# Patient Record
Sex: FEMALE | Race: WHITE | NOT HISPANIC OR LATINO | Employment: FULL TIME | URBAN - METROPOLITAN AREA
[De-identification: names, ages, dates, MRNs, and addresses within clinical notes are randomized per-mention and may not be internally consistent; named-entity substitution may affect disease eponyms.]

---

## 2023-06-19 LAB — CHORIOGONADOTROPIN (MIU/ML) IN SER/PLAS: ABNORMAL MIU/ML

## 2023-06-22 LAB — CHORIOGONADOTROPIN (MIU/ML) IN SER/PLAS: ABNORMAL MIU/ML

## 2023-07-06 LAB
ERYTHROCYTE DISTRIBUTION WIDTH (RATIO) BY AUTOMATED COUNT: 13.1 % (ref 11.5–14.5)
ERYTHROCYTE MEAN CORPUSCULAR HEMOGLOBIN CONCENTRATION (G/DL) BY AUTOMATED: 33.2 G/DL (ref 32–36)
ERYTHROCYTE MEAN CORPUSCULAR VOLUME (FL) BY AUTOMATED COUNT: 88 FL (ref 80–100)
ERYTHROCYTES (10*6/UL) IN BLOOD BY AUTOMATED COUNT: 4.37 X10E12/L (ref 4–5.2)
HEMATOCRIT (%) IN BLOOD BY AUTOMATED COUNT: 38.5 % (ref 36–46)
HEMOGLOBIN (G/DL) IN BLOOD: 12.8 G/DL (ref 12–16)
HEPATITIS B VIRUS SURFACE AG PRESENCE IN SERUM: NONREACTIVE
HEPATITIS C VIRUS AB PRESENCE IN SERUM: NONREACTIVE
HIV 1/ 2 AG/AB SCREEN: NONREACTIVE
LEAD (UG/DL) IN BLOOD: <0.5 UG/DL (ref 0–4.9)
LEUKOCYTES (10*3/UL) IN BLOOD BY AUTOMATED COUNT: 7.7 X10E9/L (ref 4.4–11.3)
PLATELETS (10*3/UL) IN BLOOD AUTOMATED COUNT: 259 X10E9/L (ref 150–450)
REFLEX ADDED, ANEMIA PANEL: NORMAL
RUBELLA VIRUS IGG AB: POSITIVE
SYPHILIS TOTAL AB: NONREACTIVE

## 2023-07-07 LAB
ABO GROUP (TYPE) IN BLOOD: NORMAL
ANTIBODY SCREEN: NORMAL
RH FACTOR: NORMAL

## 2023-07-08 LAB
CHLAMYDIA TRACH., AMPLIFIED: NORMAL
N. GONORRHEA, AMPLIFIED: NORMAL
TRICHOMONAS VAGINALIS: NORMAL

## 2023-07-10 LAB — URINE CULTURE: NORMAL

## 2023-08-12 LAB
CHLAMYDIA TRACH., AMPLIFIED: NEGATIVE
N. GONORRHEA, AMPLIFIED: NEGATIVE

## 2023-08-13 LAB — URINE CULTURE: NO GROWTH

## 2023-10-05 PROBLEM — Z87.59 HISTORY OF GESTATIONAL HYPERTENSION: Status: ACTIVE | Noted: 2023-10-05

## 2023-10-05 PROBLEM — N92.6 MISSED MENSES: Status: ACTIVE | Noted: 2023-10-05

## 2023-10-05 PROBLEM — O20.9 BLEEDING IN EARLY PREGNANCY (HHS-HCC): Status: ACTIVE | Noted: 2023-10-05

## 2023-10-05 PROBLEM — Z77.011 LEAD EXPOSURE: Status: ACTIVE | Noted: 2023-10-05

## 2023-10-05 RX ORDER — ASPIRIN 81 MG/1
2 TABLET ORAL DAILY
COMMUNITY
End: 2024-01-03 | Stop reason: HOSPADM

## 2023-10-05 RX ORDER — SYRINGE WITH NEEDLE, INSULIN
SYRINGE, EMPTY DISPOSABLE MISCELLANEOUS
COMMUNITY
End: 2024-01-03 | Stop reason: HOSPADM

## 2023-10-05 RX ORDER — DEXTROAMPHETAMINE SACCHARATE, AMPHETAMINE ASPARTATE, DEXTROAMPHETAMINE SULFATE AND AMPHETAMINE SULFATE 7.5; 7.5; 7.5; 7.5 MG/1; MG/1; MG/1; MG/1
TABLET ORAL
COMMUNITY

## 2023-10-06 ENCOUNTER — ROUTINE PRENATAL (OUTPATIENT)
Dept: OBSTETRICS AND GYNECOLOGY | Facility: CLINIC | Age: 33
End: 2023-10-06
Payer: COMMERCIAL

## 2023-10-06 VITALS — BODY MASS INDEX: 22.55 KG/M2 | DIASTOLIC BLOOD PRESSURE: 78 MMHG | SYSTOLIC BLOOD PRESSURE: 110 MMHG | WEIGHT: 144 LBS

## 2023-10-06 DIAGNOSIS — Z3A.23 23 WEEKS GESTATION OF PREGNANCY (HHS-HCC): Primary | ICD-10-CM

## 2023-10-06 DIAGNOSIS — Z34.82 PRENATAL CARE, SUBSEQUENT PREGNANCY IN SECOND TRIMESTER (HHS-HCC): ICD-10-CM

## 2023-10-06 PROCEDURE — 0501F PRENATAL FLOW SHEET: CPT

## 2023-10-06 NOTE — PROGRESS NOTES
Assessment/Plan   33 y.o.  at 23w4d  - Pt forgot birth preferences packet at home; will fill out and bring next time  - Provided with GCT supplies, instructions provided  - Routine prenatal care    Follow up in 4 weeks for next prenatal visit with JOSE E Rivas for TOLAC consult. GCT/CBC at that visit. Will review birth preferences at next visit with myself.    GAYE Szymanski-GLO    Subjective     Luba Middleton is a 33 y.o.  at 24w0d with a working estimated date of delivery of 2024, by Ultrasound presenting for a routine prenatal visit. She is doing well, no concerns. She denies vaginal bleeding, leakage of fluid, decreased fetal movements, or contractions.  Looking forward to anniversary dinner and pumpkin patch this weekend.     Her pregnancy is complicated by:  Pregnancy Problems (from 23 to present)       Problem Noted Resolved    Bleeding in early pregnancy 10/5/2023 by Tarah Arnett No    Priority:  Medium               Objective   Weight: 65.3 kg (144 lb)  Expected Total Weight Gain: 12.5 kg (27 lb)-18 kg (39 lb)   Pregravid BMI: 18.48  BP: 110/78  Fetal Heart Rate: 145 Fundal Height (cm): 23 cm

## 2023-11-01 ENCOUNTER — ROUTINE PRENATAL (OUTPATIENT)
Dept: OBSTETRICS AND GYNECOLOGY | Facility: CLINIC | Age: 33
End: 2023-11-01
Payer: COMMERCIAL

## 2023-11-01 VITALS — SYSTOLIC BLOOD PRESSURE: 120 MMHG | WEIGHT: 151.5 LBS | BODY MASS INDEX: 23.73 KG/M2 | DIASTOLIC BLOOD PRESSURE: 80 MMHG

## 2023-11-01 DIAGNOSIS — Z34.80 SUPERVISION OF OTHER NORMAL PREGNANCY, ANTEPARTUM (HHS-HCC): Primary | ICD-10-CM

## 2023-11-01 DIAGNOSIS — Z13.1 DIABETES MELLITUS SCREENING: ICD-10-CM

## 2023-11-01 DIAGNOSIS — O34.219 HISTORY OF CESAREAN SECTION COMPLICATING PREGNANCY (HHS-HCC): ICD-10-CM

## 2023-11-01 DIAGNOSIS — O20.9 BLEEDING IN EARLY PREGNANCY (HHS-HCC): ICD-10-CM

## 2023-11-01 DIAGNOSIS — O09.529 ANTEPARTUM MULTIGRAVIDA OF ADVANCED MATERNAL AGE (HHS-HCC): ICD-10-CM

## 2023-11-01 DIAGNOSIS — Z23 ENCOUNTER FOR VACCINATION: ICD-10-CM

## 2023-11-01 DIAGNOSIS — Z3A.27 27 WEEKS GESTATION OF PREGNANCY (HHS-HCC): ICD-10-CM

## 2023-11-01 LAB
ERYTHROCYTE [DISTWIDTH] IN BLOOD BY AUTOMATED COUNT: 12.6 % (ref 11.5–14.5)
GLUCOSE 1H P 50 G GLC PO SERPL-MCNC: 74 MG/DL
HCT VFR BLD AUTO: 33.8 % (ref 36–46)
HGB BLD-MCNC: 11.2 G/DL (ref 12–16)
MCH RBC QN AUTO: 30.1 PG (ref 26–34)
MCHC RBC AUTO-ENTMCNC: 33.1 G/DL (ref 32–36)
MCV RBC AUTO: 91 FL (ref 80–100)
NRBC BLD-RTO: 0 /100 WBCS (ref 0–0)
PLATELET # BLD AUTO: 264 X10*3/UL (ref 150–450)
RBC # BLD AUTO: 3.72 X10*6/UL (ref 4–5.2)
WBC # BLD AUTO: 9.8 X10*3/UL (ref 4.4–11.3)

## 2023-11-01 PROCEDURE — 90472 IMMUNIZATION ADMIN EACH ADD: CPT | Performed by: OBSTETRICS & GYNECOLOGY

## 2023-11-01 PROCEDURE — 85027 COMPLETE CBC AUTOMATED: CPT

## 2023-11-01 PROCEDURE — 90715 TDAP VACCINE 7 YRS/> IM: CPT | Performed by: OBSTETRICS & GYNECOLOGY

## 2023-11-01 PROCEDURE — 82947 ASSAY GLUCOSE BLOOD QUANT: CPT

## 2023-11-01 PROCEDURE — 90471 IMMUNIZATION ADMIN: CPT | Performed by: OBSTETRICS & GYNECOLOGY

## 2023-11-01 PROCEDURE — 90686 IIV4 VACC NO PRSV 0.5 ML IM: CPT | Performed by: OBSTETRICS & GYNECOLOGY

## 2023-11-01 PROCEDURE — 0501F PRENATAL FLOW SHEET: CPT | Performed by: OBSTETRICS & GYNECOLOGY

## 2023-11-01 PROCEDURE — 36415 COLL VENOUS BLD VENIPUNCTURE: CPT

## 2023-11-02 LAB — REFLEX ADDED, ANEMIA PANEL: NORMAL

## 2023-11-08 PROBLEM — O09.529 ANTEPARTUM MULTIGRAVIDA OF ADVANCED MATERNAL AGE (HHS-HCC): Status: ACTIVE | Noted: 2023-11-08

## 2023-11-08 PROBLEM — Z34.80 SUPERVISION OF OTHER NORMAL PREGNANCY, ANTEPARTUM (HHS-HCC): Status: ACTIVE | Noted: 2023-11-08

## 2023-11-08 PROBLEM — N92.6 MISSED MENSES: Status: RESOLVED | Noted: 2023-10-05 | Resolved: 2023-11-08

## 2023-11-08 PROBLEM — O34.219 HISTORY OF CESAREAN SECTION COMPLICATING PREGNANCY (HHS-HCC): Status: ACTIVE | Noted: 2023-11-08

## 2023-11-08 PROBLEM — O99.513 ASTHMA AFFECTING PREGNANCY IN THIRD TRIMESTER (HHS-HCC): Status: ACTIVE | Noted: 2023-11-08

## 2023-11-08 PROBLEM — F90.2 ATTENTION DEFICIT HYPERACTIVITY DISORDER (ADHD), COMBINED TYPE: Status: ACTIVE | Noted: 2023-11-08

## 2023-11-08 PROBLEM — O09.522 MULTIGRAVIDA OF ADVANCED MATERNAL AGE IN SECOND TRIMESTER (HHS-HCC): Status: ACTIVE | Noted: 2023-11-08

## 2023-11-08 PROBLEM — Z77.011 LEAD EXPOSURE: Status: RESOLVED | Noted: 2023-10-05 | Resolved: 2023-11-08

## 2023-11-08 PROBLEM — J45.909 ASTHMA AFFECTING PREGNANCY IN THIRD TRIMESTER (HHS-HCC): Status: ACTIVE | Noted: 2023-11-08

## 2023-11-08 NOTE — PROGRESS NOTES
Routine prenatal visit   Pt doing well overall - no complaints today.  Physician visit today to discuss TOLAC - risks/benefits discussed in detail and TOLAC consent obtained. Prior c/s was for breech.  Glucola today.  Rh positive. Flu and Tdap vaccines given today.  Discussed recommendation for updated COVID vaccine.     Medical Problems       Problem List       History of gestational hypertension    Overview Signed 2023  1:10 PM by Fadumo Rivas MD     - on prophylactic ASA          Supervision of other normal pregnancy, antepartum    Overview Signed 2023  1:10 PM by Fadumo Rivas MD     - [facility] - reports negative carrier screening in first pregnancy          History of  section complicating pregnancy    Overview Signed 2023  1:12 PM by Fadumo Rivas MD     - done for breech   - planning TOLAC  - TOLAC consent signed    - [facility] 80%         Attention deficit hyperactivity disorder (ADHD), combined type    Overview Signed 2023  1:12 PM by Fadumo Rivas MD     - on adderall          Asthma affecting pregnancy in third trimester    Antepartum multigravida of advanced maternal age    Overview Signed 2023  1:13 PM by Fadumo Rivas MD     - risk-reducing NIPS                Follow up in 3 week(s).

## 2023-11-15 ENCOUNTER — TELEPHONE (OUTPATIENT)
Dept: OBSTETRICS AND GYNECOLOGY | Facility: CLINIC | Age: 33
End: 2023-11-15

## 2023-11-15 NOTE — TELEPHONE ENCOUNTER
Patient sent the following email:    Camron Frye,     I was going to call the office but I thought emailing you might be easier/quicker. Please let me know if I should just call.     Dotty Dawson, my midwife, gave me a Birth Plan document at my last visit which I was supposed to fill out to go over with her at my next appointment which is this Friday. I lost the document! Is there any way you can send me the Birth Plan document?     Let me know and thanks so much for your time and attention to this matter.     Rylan,   Luba      Patient emailed form.

## 2023-11-17 ENCOUNTER — ROUTINE PRENATAL (OUTPATIENT)
Dept: OBSTETRICS AND GYNECOLOGY | Facility: CLINIC | Age: 33
End: 2023-11-17
Payer: COMMERCIAL

## 2023-11-17 VITALS — WEIGHT: 153 LBS | DIASTOLIC BLOOD PRESSURE: 74 MMHG | SYSTOLIC BLOOD PRESSURE: 118 MMHG | BODY MASS INDEX: 23.96 KG/M2

## 2023-11-17 DIAGNOSIS — Z34.83 PRENATAL CARE, SUBSEQUENT PREGNANCY IN THIRD TRIMESTER (HHS-HCC): ICD-10-CM

## 2023-11-17 DIAGNOSIS — Z3A.29 29 WEEKS GESTATION OF PREGNANCY (HHS-HCC): Primary | ICD-10-CM

## 2023-11-17 PROCEDURE — 0501F PRENATAL FLOW SHEET: CPT

## 2023-11-17 NOTE — PROGRESS NOTES
Assessment/Plan   33 y.o.  at 29w4d  - birth preferences reviewed and scanned into chart; pt prefers not to be induced, NCB, Nitrous if/as needed. Aware she will need continuous FM and saline lock due to history of  section.   - Routine prenatal care    Follow up in 2 weeks for next prenatal visit    COLTON Szymanski    Subjective     Luba Middleton is a 33 y.o.  at 29w4d with a working estimated date of delivery of 2024, by Last Menstrual Period presenting for a routine prenatal visit. She is doing well, no concerns. She denies vaginal bleeding, leakage of fluid, or regular contractions. She endorses good FM.    Pregnancy Problems (from 23 to present)       Problem Noted Resolved    Supervision of other normal pregnancy, antepartum 2023 by Fadumo Rivas MD No    Priority:  Medium      Overview Addendum 2023  2:13 PM by COLTON Szymanski     - [employer] - reports negative carrier screening in first pregnancy   - s/p flu vaccine  - s/p Tdap vaccine  - Updated COVID vaccine recommended  - recommend RSV vaccine between 32-36w  - Birth plan: TOLAC, prefers not to be induced, NCB, Nitrous if needed         History of  section complicating pregnancy 2023 by Fadumo Rivas MD No    Priority:  Medium      Overview Signed 2023  1:12 PM by Fadumo Rivas MD     - done for breech   - planning TOLAC  - TOLAC consent signed    - [facility] 80%         Asthma affecting pregnancy in third trimester 2023 by Fadumo Rivas MD No    Priority:  Medium      Antepartum multigravida of advanced maternal age 2023 by Fadumo Rivas MD No    Priority:  Medium      Overview Signed 2023  1:13 PM by Fadumo Rivas MD     - risk-reducing NIPS          History of gestational hypertension 10/5/2023 by Tarah Arnett No    Priority:  Medium      Overview Signed 2023  1:10 PM by Fadumo Rivas MD     -  on prophylactic ASA                  Objective   Weight: 69.4 kg (153 lb)  TWG: 15.9 kg (35 lb)   Expected Total Weight Gain: 12.5 kg (27 lb)-18 kg (39 lb)   Pregravid BMI: 18.48  Pregravid Weight: 53.5 kg (118 lb)   BP: 118/74  Fetal Heart Rate: 148 Fundal Height (cm): 30 cm

## 2023-12-01 ENCOUNTER — ROUTINE PRENATAL (OUTPATIENT)
Dept: OBSTETRICS AND GYNECOLOGY | Facility: CLINIC | Age: 33
End: 2023-12-01
Payer: COMMERCIAL

## 2023-12-01 VITALS — DIASTOLIC BLOOD PRESSURE: 88 MMHG | BODY MASS INDEX: 24.21 KG/M2 | SYSTOLIC BLOOD PRESSURE: 136 MMHG | WEIGHT: 154.6 LBS

## 2023-12-01 DIAGNOSIS — Z34.83 PRENATAL CARE, SUBSEQUENT PREGNANCY IN THIRD TRIMESTER (HHS-HCC): Primary | ICD-10-CM

## 2023-12-01 DIAGNOSIS — F90.2 ATTENTION DEFICIT HYPERACTIVITY DISORDER (ADHD), COMBINED TYPE: ICD-10-CM

## 2023-12-01 DIAGNOSIS — Z3A.31 31 WEEKS GESTATION OF PREGNANCY (HHS-HCC): ICD-10-CM

## 2023-12-01 PROCEDURE — 0501F PRENATAL FLOW SHEET: CPT

## 2023-12-01 NOTE — PROGRESS NOTES
Assessment/Plan   33 y.o.  at 31w4d  - Recommended RSV vaccination between 32-36 weeks gestation, may receive at [facility]. Pt plans to get this done.  - Growth scan ordered d/t ADHD on adderall; pt instructed on how to schedule  - If breech on scan, recommended chiropractic care to encourage fetus to turn vertex  - Routine prenatal care    Follow up in 2 weeks for next prenatal visit. Did she get RSV vaccine? Review results from growth scan.    COLTON Szymanski    Subjective     Luba Middleton is a 33 y.o.  at 31w4d with a working estimated date of delivery of 2024, by Last Menstrual Period presenting for a routine prenatal visit. She is doing well, no concerns. She denies vaginal bleeding, leakage of fluid, or regular contractions. She endorses good FM.    She is fearful of the baby being breech again, as her prior experience with an unsuccessful version was traumatic.     Her pregnancy is complicated by:  Pregnancy Problems (from 23 to present)       Problem Noted Resolved    Supervision of other normal pregnancy, antepartum 2023 by Fadumo Rivas MD No    Priority:  Medium      Overview Addendum 2023  2:13 PM by COLTON Szymanski     - [employer] - reports negative carrier screening in first pregnancy   - s/p flu vaccine  - s/p Tdap vaccine  - Updated COVID vaccine recommended  - recommend RSV vaccine between 32-36w  - Birth plan: TOLAC, prefers not to be induced, NCB, Nitrous if needed         History of  section complicating pregnancy 2023 by Fadumo Rivas MD No    Priority:  Medium      Overview Signed 2023  1:12 PM by Fadumo Rivas MD     - done for breech   - planning TOLAC  - TOLAC consent signed    - [facility] 80%         Asthma affecting pregnancy in third trimester 2023 by Fadumo Rivas MD No    Priority:  Medium      Antepartum multigravida of advanced maternal age 2023 by Fadumo PLASCENCIA  MD Rob No    Priority:  Medium      Overview Signed 2023  1:13 PM by Fadumo Rivas MD     - risk-reducing NIPS          History of gestational hypertension 10/5/2023 by Tarah Arnett No    Priority:  Medium      Overview Signed 2023  1:10 PM by Fadumo Rivas MD     - on prophylactic ASA                   Objective   Weight: 70.1 kg (154 lb 9.6 oz)  TW.6 kg (36 lb 9.6 oz)   Expected Total Weight Gain: 12.5 kg (27 lb)-18 kg (39 lb)   Pregravid BMI: 18.48  Pregravid Weight: 53.5 kg (118 lb)   BP: 136/88  Fetal Heart Rate: 150 Fundal Height (cm): 31 cm Presentation: Vertex

## 2023-12-04 ENCOUNTER — TELEPHONE (OUTPATIENT)
Dept: OBSTETRICS AND GYNECOLOGY | Facility: CLINIC | Age: 33
End: 2023-12-04

## 2023-12-04 PROBLEM — U07.1 SARS-COV-2 POSITIVE: Status: ACTIVE | Noted: 2023-12-04

## 2023-12-13 ENCOUNTER — ANCILLARY PROCEDURE (OUTPATIENT)
Dept: RADIOLOGY | Facility: CLINIC | Age: 33
End: 2023-12-13
Payer: COMMERCIAL

## 2023-12-13 DIAGNOSIS — Z34.83 PRENATAL CARE, SUBSEQUENT PREGNANCY IN THIRD TRIMESTER (HHS-HCC): ICD-10-CM

## 2023-12-13 DIAGNOSIS — F90.2 ATTENTION DEFICIT HYPERACTIVITY DISORDER (ADHD), COMBINED TYPE: ICD-10-CM

## 2023-12-13 PROCEDURE — 76819 FETAL BIOPHYS PROFIL W/O NST: CPT

## 2023-12-13 PROCEDURE — 76819 FETAL BIOPHYS PROFIL W/O NST: CPT | Performed by: OBSTETRICS & GYNECOLOGY

## 2023-12-13 PROCEDURE — 76816 OB US FOLLOW-UP PER FETUS: CPT

## 2023-12-13 PROCEDURE — 76816 OB US FOLLOW-UP PER FETUS: CPT | Performed by: OBSTETRICS & GYNECOLOGY

## 2023-12-15 ENCOUNTER — ROUTINE PRENATAL (OUTPATIENT)
Dept: OBSTETRICS AND GYNECOLOGY | Facility: CLINIC | Age: 33
End: 2023-12-15
Payer: COMMERCIAL

## 2023-12-15 VITALS — WEIGHT: 155 LBS | SYSTOLIC BLOOD PRESSURE: 118 MMHG | BODY MASS INDEX: 24.28 KG/M2 | DIASTOLIC BLOOD PRESSURE: 90 MMHG

## 2023-12-15 DIAGNOSIS — O13.9 GESTATIONAL HYPERTENSION AFFECTING SECOND PREGNANCY (HHS-HCC): Primary | ICD-10-CM

## 2023-12-15 LAB
ALBUMIN SERPL BCP-MCNC: 3.2 G/DL (ref 3.4–5)
ALP SERPL-CCNC: 97 U/L (ref 33–110)
ALT SERPL W P-5'-P-CCNC: 10 U/L (ref 7–45)
ANION GAP SERPL CALC-SCNC: 11 MMOL/L (ref 10–20)
AST SERPL W P-5'-P-CCNC: 15 U/L (ref 9–39)
BILIRUB SERPL-MCNC: 1.1 MG/DL (ref 0–1.2)
BUN SERPL-MCNC: 9 MG/DL (ref 6–23)
CALCIUM SERPL-MCNC: 8.5 MG/DL (ref 8.6–10.3)
CHLORIDE SERPL-SCNC: 104 MMOL/L (ref 98–107)
CO2 SERPL-SCNC: 25 MMOL/L (ref 21–32)
CREAT SERPL-MCNC: 0.72 MG/DL (ref 0.5–1.05)
CREAT UR-MCNC: 109.3 MG/DL (ref 20–320)
ERYTHROCYTE [DISTWIDTH] IN BLOOD BY AUTOMATED COUNT: 12.4 % (ref 11.5–14.5)
GFR SERPL CREATININE-BSD FRML MDRD: >90 ML/MIN/1.73M*2
GLUCOSE SERPL-MCNC: 64 MG/DL (ref 74–99)
HCT VFR BLD AUTO: 35.5 % (ref 36–46)
HGB BLD-MCNC: 11.5 G/DL (ref 12–16)
MCH RBC QN AUTO: 28.5 PG (ref 26–34)
MCHC RBC AUTO-ENTMCNC: 32.4 G/DL (ref 32–36)
MCV RBC AUTO: 88 FL (ref 80–100)
NRBC BLD-RTO: 0 /100 WBCS (ref 0–0)
PLATELET # BLD AUTO: 281 X10*3/UL (ref 150–450)
POTASSIUM SERPL-SCNC: 4.2 MMOL/L (ref 3.5–5.3)
PROT SERPL-MCNC: 6.1 G/DL (ref 6.4–8.2)
PROT UR-ACNC: 10 MG/DL (ref 5–24)
PROT/CREAT UR: 0.09 MG/MG CREAT (ref 0–0.17)
RBC # BLD AUTO: 4.04 X10*6/UL (ref 4–5.2)
SODIUM SERPL-SCNC: 136 MMOL/L (ref 136–145)
WBC # BLD AUTO: 9 X10*3/UL (ref 4.4–11.3)

## 2023-12-15 PROCEDURE — 0501F PRENATAL FLOW SHEET: CPT | Performed by: ADVANCED PRACTICE MIDWIFE

## 2023-12-15 PROCEDURE — 84156 ASSAY OF PROTEIN URINE: CPT

## 2023-12-15 PROCEDURE — 85027 COMPLETE CBC AUTOMATED: CPT

## 2023-12-15 PROCEDURE — 80053 COMPREHEN METABOLIC PANEL: CPT

## 2023-12-15 PROCEDURE — 82570 ASSAY OF URINE CREATININE: CPT

## 2023-12-15 PROCEDURE — 36415 COLL VENOUS BLD VENIPUNCTURE: CPT

## 2023-12-15 NOTE — PROGRESS NOTES
Subjective   Patient ID [id number]   Luba Middleton is a 33 y.o.  at 33w4d with a working estimated date of delivery of 2024, by Last Menstrual Period who presents for a routine prenatal visit. She denies vaginal bleeding, contractions or leaking of fluid.  B/p 118/90 repeat 135/95  No complaints today    Objective   Physical Exam:     Constitutional: Alert and oriented, cooperative, no acute distress, well nourished, well hydrated     HEENT: normal     OB:  fundus at 33 cm    Neuropsych: normal affect, well groomed, good eye contact           , Pregravid BMI: 18.48  Expected Total Weight Gain: 12.5 kg (27 lb)-18 kg (39 lb)      Medical Problems       Problem List       History of gestational hypertension    Overview Addendum 12/15/2023  2:02 PM by COLTON Sen     - on prophylactic ASA   - 12/15 b/p elevation at 33 weeks, NST PET labs  - close interval visit  for b/p check         Supervision of other normal pregnancy, antepartum    Overview Addendum 2023  2:13 PM by COLTON Szymanski     - [employer] - reports negative carrier screening in first pregnancy   - s/p flu vaccine  - s/p Tdap vaccine  - Updated COVID vaccine recommended  - recommend RSV vaccine between 32-36w  - Birth plan: TOLAC, prefers not to be induced, NCB, Nitrous if needed         History of  section complicating pregnancy    Overview Signed 2023  1:12 PM by Fadumo Rivas MD     - done for breech   - planning TOLAC  - TOLAC consent signed    - [facility] 80%         Attention deficit hyperactivity disorder (ADHD), combined type    Overview Addendum 2023  8:16 AM by COLTON Szymanski     - on adderall   - growth scan WNL @ 33w         Asthma affecting pregnancy in third trimester    Antepartum multigravida of advanced maternal age    Overview Signed 2023  1:13 PM by Fadumo Rivas MD     - risk-reducing NIPS          SARS-CoV-2 positive    Overview Signed  2023  3:02 PM by COLTON Szymanski     Positive home test on 12/3/23                       Assessment/Plan   Elevated b/p today. NST PET labs Janet to follow up on labs  Continue prenatal vitamin.   labor signs and symptoms reviewed, discuss [facility] level 1 if any admission necessary prior to 35 weeks will need to go to [facility] main [facility]  Pt will likely chose a repeat  if IOL is needed for hypertension  Inquire about birthing plans (epidural, NCB, HBC, Room 8, nitrous)  Discussed diagnosis of gestational hypertension if b/p is elevated on Monday and subsequent plan of care  Follow up in 3 days for b/p check

## 2023-12-18 ENCOUNTER — ROUTINE PRENATAL (OUTPATIENT)
Dept: OBSTETRICS AND GYNECOLOGY | Facility: CLINIC | Age: 33
End: 2023-12-18
Payer: COMMERCIAL

## 2023-12-18 VITALS — DIASTOLIC BLOOD PRESSURE: 78 MMHG | BODY MASS INDEX: 24.75 KG/M2 | SYSTOLIC BLOOD PRESSURE: 118 MMHG | WEIGHT: 158 LBS

## 2023-12-18 DIAGNOSIS — Z3A.34 34 WEEKS GESTATION OF PREGNANCY (HHS-HCC): Primary | ICD-10-CM

## 2023-12-18 DIAGNOSIS — Z34.83 PRENATAL CARE, SUBSEQUENT PREGNANCY IN THIRD TRIMESTER (HHS-HCC): ICD-10-CM

## 2023-12-18 PROCEDURE — 59426 ANTEPARTUM CARE ONLY: CPT

## 2023-12-18 NOTE — PROGRESS NOTES
Assessment/Plan   33 y.o.  at 34w0d  - BP today 118/78  - reviewed s/s of PEC for which to call emergency answering service line  - discussed GBS to be collected at next visit, all questions answered. Pt denies allergy to PCN.   - Routine prenatal care    Follow up in 2 weeks for next prenatal visit. GBS and labor consent at next visit.    COLTON Szymanski    Subjective     Luba Middleton is a 33 y.o.  at 34w0d with a working estimated date of delivery of 2024, by Last Menstrual Period presenting for a close-interval prenatal visit to monitor BP. Pt was seen last on 12/15/23 by FREDRICK Auguste and noted to have a mild-range BP of 135/95, repeat 118/90. She had a reactive NST and nml PEC labs drawn at that time.   She is doing well, no concerns. She denies vaginal bleeding, leakage of fluid, or regular contractions. She endorses good FM.    Pregnancy Problems (from 23 to present)       Problem Noted Resolved    SARS-CoV-2 positive 2023 by COLTON Szymanski No    Priority:  Medium      Overview Signed 2023  3:02 PM by COLTON Szymanski     Positive home test on 12/3/23         Supervision of other normal pregnancy, antepartum 2023 by Fadumo Rivas MD No    Priority:  Medium      Overview Addendum 2023  9:17 AM by COLTON Szymanski     - [employer] - reports negative carrier screening in first pregnancy   - s/p flu vaccine  - s/p Tdap vaccine  - Updated COVID vaccine recommended  - recommend RSV vaccine between 32-36w  - Birth plan: TOLAC, prefers not to be induced, NCB, Nitrous if needed. Pt will likely choose a repeat  if IOL is needed for hypertension          History of  section complicating pregnancy 2023 by Fadumo Rivas MD No    Priority:  Medium      Overview Signed 2023  1:12 PM by Fadumo Rivas MD     - done for breech   - planning TOLAC  - TOLAC consent signed    - [facility] 80%          Asthma affecting pregnancy in third trimester 2023 by Fadumo Rivas MD No    Priority:  Medium      Antepartum multigravida of advanced maternal age 2023 by Fadumo Rivas MD No    Priority:  Medium      Overview Signed 2023  1:13 PM by Fadumo Rivas MD     - risk-reducing NIPS          History of gestational hypertension 10/5/2023 by Tarah Arnett No    Priority:  Medium      Overview Addendum 2023  9:18 AM by GAYE Szymanski-GUSTAVOM     - on prophylactic ASA   - 12/15 BP elevation at 33 weeks, NST, PET labs WNL  - close interval visit  for b/p check                 Objective   Weight: 71.7 kg (158 lb)  TW.1 kg (40 lb)   Expected Total Weight Gain: 12.5 kg (27 lb)-18 kg (39 lb)   Pregravid BMI: 18.48  Pregravid Weight: 53.5 kg (118 lb)   BP: 118/78  Fetal Heart Rate: 135 Fundal Height (cm): 34 cm Presentation: Vertex

## 2024-01-01 ENCOUNTER — HOSPITAL ENCOUNTER (INPATIENT)
Facility: HOSPITAL | Age: 34
LOS: 2 days | Discharge: HOME | End: 2024-01-03
Attending: OBSTETRICS & GYNECOLOGY
Payer: COMMERCIAL

## 2024-01-01 DIAGNOSIS — K59.03 DRUG-INDUCED CONSTIPATION: ICD-10-CM

## 2024-01-01 PROBLEM — O42.919 PRETERM PREMATURE RUPTURE OF MEMBRANES (PPROM) WITH UNKNOWN ONSET OF LABOR (HHS-HCC): Status: ACTIVE | Noted: 2024-01-01

## 2024-01-01 PROBLEM — O13.3 GESTATIONAL HYPERTENSION, THIRD TRIMESTER (HHS-HCC): Status: ACTIVE | Noted: 2024-01-01

## 2024-01-01 LAB
ABO GROUP (TYPE) IN BLOOD: NORMAL
ALBUMIN SERPL BCP-MCNC: 3.1 G/DL (ref 3.4–5)
ALP SERPL-CCNC: 107 U/L (ref 33–110)
ALT SERPL W P-5'-P-CCNC: 6 U/L (ref 7–45)
ANION GAP SERPL CALC-SCNC: 13 MMOL/L (ref 10–20)
ANTIBODY SCREEN: NORMAL
AST SERPL W P-5'-P-CCNC: 15 U/L (ref 9–39)
BILIRUB SERPL-MCNC: 0.7 MG/DL (ref 0–1.2)
BUN SERPL-MCNC: 10 MG/DL (ref 6–23)
CALCIUM SERPL-MCNC: 8.8 MG/DL (ref 8.6–10.3)
CHLORIDE SERPL-SCNC: 104 MMOL/L (ref 98–107)
CO2 SERPL-SCNC: 21 MMOL/L (ref 21–32)
CREAT SERPL-MCNC: 0.66 MG/DL (ref 0.5–1.05)
ERYTHROCYTE [DISTWIDTH] IN BLOOD BY AUTOMATED COUNT: 13.1 % (ref 11.5–14.5)
GFR SERPL CREATININE-BSD FRML MDRD: >90 ML/MIN/1.73M*2
GLUCOSE SERPL-MCNC: 75 MG/DL (ref 74–99)
HCT VFR BLD AUTO: 33.7 % (ref 36–46)
HGB BLD-MCNC: 10.9 G/DL (ref 12–16)
HOLD SPECIMEN: NORMAL
HOLD SPECIMEN: NORMAL
LDH SERPL L TO P-CCNC: 194 U/L (ref 84–246)
MCH RBC QN AUTO: 27.2 PG (ref 26–34)
MCHC RBC AUTO-ENTMCNC: 32.3 G/DL (ref 32–36)
MCV RBC AUTO: 84 FL (ref 80–100)
NRBC BLD-RTO: 0 /100 WBCS (ref 0–0)
PLATELET # BLD AUTO: 187 X10*3/UL (ref 150–450)
POTASSIUM SERPL-SCNC: 3.8 MMOL/L (ref 3.5–5.3)
PROT SERPL-MCNC: 6.2 G/DL (ref 6.4–8.2)
RBC # BLD AUTO: 4.01 X10*6/UL (ref 4–5.2)
RH FACTOR (ANTIGEN D): NORMAL
SODIUM SERPL-SCNC: 134 MMOL/L (ref 136–145)
WBC # BLD AUTO: 11.1 X10*3/UL (ref 4.4–11.3)

## 2024-01-01 PROCEDURE — 86920 COMPATIBILITY TEST SPIN: CPT

## 2024-01-01 PROCEDURE — 0HQ9XZZ REPAIR PERINEUM SKIN, EXTERNAL APPROACH: ICD-10-PCS

## 2024-01-01 PROCEDURE — 7210000002 HC LABOR PER HOUR

## 2024-01-01 PROCEDURE — 36415 COLL VENOUS BLD VENIPUNCTURE: CPT

## 2024-01-01 PROCEDURE — 87081 CULTURE SCREEN ONLY: CPT | Mod: STJLAB

## 2024-01-01 PROCEDURE — 86780 TREPONEMA PALLIDUM: CPT | Mod: STJLAB

## 2024-01-01 PROCEDURE — 80053 COMPREHEN METABOLIC PANEL: CPT

## 2024-01-01 PROCEDURE — 83615 LACTATE (LD) (LDH) ENZYME: CPT

## 2024-01-01 PROCEDURE — 88307 TISSUE EXAM BY PATHOLOGIST: CPT | Mod: TC,SUR,STJLAB

## 2024-01-01 PROCEDURE — 88307 TISSUE EXAM BY PATHOLOGIST: CPT | Performed by: PATHOLOGY

## 2024-01-01 PROCEDURE — 96372 THER/PROPH/DIAG INJ SC/IM: CPT

## 2024-01-01 PROCEDURE — 85027 COMPLETE CBC AUTOMATED: CPT

## 2024-01-01 PROCEDURE — 1120000001 HC OB PRIVATE ROOM DAILY

## 2024-01-01 PROCEDURE — 0UQMXZZ REPAIR VULVA, EXTERNAL APPROACH: ICD-10-PCS

## 2024-01-01 PROCEDURE — 2500000004 HC RX 250 GENERAL PHARMACY W/ HCPCS (ALT 636 FOR OP/ED)

## 2024-01-01 PROCEDURE — 2500000001 HC RX 250 WO HCPCS SELF ADMINISTERED DRUGS (ALT 637 FOR MEDICARE OP)

## 2024-01-01 PROCEDURE — 2500000005 HC RX 250 GENERAL PHARMACY W/O HCPCS

## 2024-01-01 PROCEDURE — 94762 N-INVAS EAR/PLS OXIMTRY CONT: CPT

## 2024-01-01 PROCEDURE — 7100000016 HC LABOR RECOVERY PER HOUR

## 2024-01-01 PROCEDURE — 59050 FETAL MONITOR W/REPORT: CPT

## 2024-01-01 PROCEDURE — 86901 BLOOD TYPING SEROLOGIC RH(D): CPT

## 2024-01-01 PROCEDURE — 59409 OBSTETRICAL CARE: CPT

## 2024-01-01 RX ORDER — CARBOPROST TROMETHAMINE 250 UG/ML
250 INJECTION, SOLUTION INTRAMUSCULAR ONCE AS NEEDED
Status: DISCONTINUED | OUTPATIENT
Start: 2024-01-01 | End: 2024-01-03 | Stop reason: HOSPADM

## 2024-01-01 RX ORDER — CARBOPROST TROMETHAMINE 250 UG/ML
250 INJECTION, SOLUTION INTRAMUSCULAR ONCE AS NEEDED
Status: DISCONTINUED | OUTPATIENT
Start: 2024-01-01 | End: 2024-01-01

## 2024-01-01 RX ORDER — LABETALOL HYDROCHLORIDE 5 MG/ML
20 INJECTION, SOLUTION INTRAVENOUS ONCE AS NEEDED
Status: DISCONTINUED | OUTPATIENT
Start: 2024-01-01 | End: 2024-01-03 | Stop reason: HOSPADM

## 2024-01-01 RX ORDER — DEXTROAMPHETAMINE SACCHARATE, AMPHETAMINE ASPARTATE, DEXTROAMPHETAMINE SULFATE AND AMPHETAMINE SULFATE 2.5; 2.5; 2.5; 2.5 MG/1; MG/1; MG/1; MG/1
30 TABLET ORAL
Status: DISCONTINUED | OUTPATIENT
Start: 2024-01-02 | End: 2024-01-03 | Stop reason: HOSPADM

## 2024-01-01 RX ORDER — OXYTOCIN 10 [USP'U]/ML
10 INJECTION, SOLUTION INTRAMUSCULAR; INTRAVENOUS ONCE AS NEEDED
Status: DISCONTINUED | OUTPATIENT
Start: 2024-01-01 | End: 2024-01-03 | Stop reason: HOSPADM

## 2024-01-01 RX ORDER — NIFEDIPINE 10 MG/1
10 CAPSULE ORAL ONCE AS NEEDED
Status: DISCONTINUED | OUTPATIENT
Start: 2024-01-01 | End: 2024-01-03 | Stop reason: HOSPADM

## 2024-01-01 RX ORDER — AMOXICILLIN 250 MG
2 CAPSULE ORAL NIGHTLY PRN
Status: DISCONTINUED | OUTPATIENT
Start: 2024-01-01 | End: 2024-01-03 | Stop reason: HOSPADM

## 2024-01-01 RX ORDER — SIMETHICONE 80 MG
80 TABLET,CHEWABLE ORAL 4 TIMES DAILY PRN
Status: DISCONTINUED | OUTPATIENT
Start: 2024-01-01 | End: 2024-01-03 | Stop reason: HOSPADM

## 2024-01-01 RX ORDER — IBUPROFEN 600 MG/1
600 TABLET ORAL EVERY 6 HOURS SCHEDULED
Status: DISCONTINUED | OUTPATIENT
Start: 2024-01-02 | End: 2024-01-01

## 2024-01-01 RX ORDER — LOPERAMIDE HYDROCHLORIDE 2 MG/1
4 CAPSULE ORAL EVERY 2 HOUR PRN
Status: DISCONTINUED | OUTPATIENT
Start: 2024-01-01 | End: 2024-01-03 | Stop reason: HOSPADM

## 2024-01-01 RX ORDER — DIPHENHYDRAMINE HYDROCHLORIDE 50 MG/ML
25 INJECTION INTRAMUSCULAR; INTRAVENOUS EVERY 6 HOURS PRN
Status: DISCONTINUED | OUTPATIENT
Start: 2024-01-01 | End: 2024-01-03 | Stop reason: HOSPADM

## 2024-01-01 RX ORDER — ACETAMINOPHEN 325 MG/1
975 TABLET ORAL EVERY 6 HOURS SCHEDULED
Status: DISCONTINUED | OUTPATIENT
Start: 2024-01-02 | End: 2024-01-01

## 2024-01-01 RX ORDER — OXYTOCIN/0.9 % SODIUM CHLORIDE 30/500 ML
60 PLASTIC BAG, INJECTION (ML) INTRAVENOUS ONCE AS NEEDED
Status: DISCONTINUED | OUTPATIENT
Start: 2024-01-01 | End: 2024-01-01

## 2024-01-01 RX ORDER — METOCLOPRAMIDE HYDROCHLORIDE 5 MG/ML
10 INJECTION INTRAMUSCULAR; INTRAVENOUS EVERY 6 HOURS PRN
Status: DISCONTINUED | OUTPATIENT
Start: 2024-01-01 | End: 2024-01-01

## 2024-01-01 RX ORDER — ACETAMINOPHEN 325 MG/1
975 TABLET ORAL EVERY 6 HOURS SCHEDULED
Status: DISCONTINUED | OUTPATIENT
Start: 2024-01-01 | End: 2024-01-03 | Stop reason: HOSPADM

## 2024-01-01 RX ORDER — LIDOCAINE HYDROCHLORIDE 10 MG/ML
30 INJECTION INFILTRATION; PERINEURAL ONCE AS NEEDED
Status: DISCONTINUED | OUTPATIENT
Start: 2024-01-01 | End: 2024-01-01

## 2024-01-01 RX ORDER — OXYTOCIN 10 [USP'U]/ML
10 INJECTION, SOLUTION INTRAMUSCULAR; INTRAVENOUS ONCE AS NEEDED
Status: DISCONTINUED | OUTPATIENT
Start: 2024-01-01 | End: 2024-01-01

## 2024-01-01 RX ORDER — TRANEXAMIC ACID 100 MG/ML
1000 INJECTION, SOLUTION INTRAVENOUS ONCE AS NEEDED
Status: DISCONTINUED | OUTPATIENT
Start: 2024-01-01 | End: 2024-01-03 | Stop reason: HOSPADM

## 2024-01-01 RX ORDER — IBUPROFEN 600 MG/1
600 TABLET ORAL EVERY 6 HOURS SCHEDULED
Status: DISCONTINUED | OUTPATIENT
Start: 2024-01-01 | End: 2024-01-03 | Stop reason: HOSPADM

## 2024-01-01 RX ORDER — HYDRALAZINE HYDROCHLORIDE 20 MG/ML
5 INJECTION INTRAMUSCULAR; INTRAVENOUS ONCE AS NEEDED
Status: DISCONTINUED | OUTPATIENT
Start: 2024-01-01 | End: 2024-01-03 | Stop reason: HOSPADM

## 2024-01-01 RX ORDER — BETAMETHASONE SODIUM PHOSPHATE AND BETAMETHASONE ACETATE 3; 3 MG/ML; MG/ML
12 INJECTION, SUSPENSION INTRA-ARTICULAR; INTRALESIONAL; INTRAMUSCULAR; SOFT TISSUE EVERY 24 HOURS
Status: DISCONTINUED | OUTPATIENT
Start: 2024-01-01 | End: 2024-01-01

## 2024-01-01 RX ORDER — LIDOCAINE 560 MG/1
1 PATCH PERCUTANEOUS; TOPICAL; TRANSDERMAL
Status: DISCONTINUED | OUTPATIENT
Start: 2024-01-01 | End: 2024-01-03 | Stop reason: HOSPADM

## 2024-01-01 RX ORDER — OXYTOCIN/0.9 % SODIUM CHLORIDE 30/500 ML
60 PLASTIC BAG, INJECTION (ML) INTRAVENOUS
Status: DISCONTINUED | OUTPATIENT
Start: 2024-01-01 | End: 2024-01-03 | Stop reason: HOSPADM

## 2024-01-01 RX ORDER — METHYLERGONOVINE MALEATE 0.2 MG/ML
0.2 INJECTION INTRAVENOUS ONCE AS NEEDED
Status: DISCONTINUED | OUTPATIENT
Start: 2024-01-01 | End: 2024-01-01

## 2024-01-01 RX ORDER — ADHESIVE BANDAGE
10 BANDAGE TOPICAL
Status: DISCONTINUED | OUTPATIENT
Start: 2024-01-01 | End: 2024-01-03 | Stop reason: HOSPADM

## 2024-01-01 RX ORDER — ONDANSETRON HYDROCHLORIDE 2 MG/ML
4 INJECTION, SOLUTION INTRAVENOUS EVERY 6 HOURS PRN
Status: DISCONTINUED | OUTPATIENT
Start: 2024-01-01 | End: 2024-01-03 | Stop reason: HOSPADM

## 2024-01-01 RX ORDER — PENICILLIN G 3000000 [IU]/50ML
3 INJECTION, SOLUTION INTRAVENOUS EVERY 4 HOURS
Status: DISCONTINUED | OUTPATIENT
Start: 2024-01-01 | End: 2024-01-01

## 2024-01-01 RX ORDER — NIFEDIPINE 10 MG/1
10 CAPSULE ORAL ONCE AS NEEDED
Status: DISCONTINUED | OUTPATIENT
Start: 2024-01-01 | End: 2024-01-01

## 2024-01-01 RX ORDER — HYDRALAZINE HYDROCHLORIDE 20 MG/ML
5 INJECTION INTRAMUSCULAR; INTRAVENOUS ONCE AS NEEDED
Status: DISCONTINUED | OUTPATIENT
Start: 2024-01-01 | End: 2024-01-01

## 2024-01-01 RX ORDER — BISACODYL 10 MG/1
10 SUPPOSITORY RECTAL DAILY PRN
Status: DISCONTINUED | OUTPATIENT
Start: 2024-01-01 | End: 2024-01-03 | Stop reason: HOSPADM

## 2024-01-01 RX ORDER — MISOPROSTOL 200 UG/1
800 TABLET ORAL ONCE AS NEEDED
Status: DISCONTINUED | OUTPATIENT
Start: 2024-01-01 | End: 2024-01-01

## 2024-01-01 RX ORDER — ONDANSETRON 4 MG/1
4 TABLET, FILM COATED ORAL EVERY 6 HOURS PRN
Status: DISCONTINUED | OUTPATIENT
Start: 2024-01-01 | End: 2024-01-01

## 2024-01-01 RX ORDER — TRANEXAMIC ACID 100 MG/ML
1000 INJECTION, SOLUTION INTRAVENOUS ONCE AS NEEDED
Status: DISCONTINUED | OUTPATIENT
Start: 2024-01-01 | End: 2024-01-01

## 2024-01-01 RX ORDER — LABETALOL HYDROCHLORIDE 5 MG/ML
20 INJECTION, SOLUTION INTRAVENOUS ONCE AS NEEDED
Status: DISCONTINUED | OUTPATIENT
Start: 2024-01-01 | End: 2024-01-01

## 2024-01-01 RX ORDER — METOCLOPRAMIDE 10 MG/1
10 TABLET ORAL EVERY 6 HOURS PRN
Status: DISCONTINUED | OUTPATIENT
Start: 2024-01-01 | End: 2024-01-01

## 2024-01-01 RX ORDER — MISOPROSTOL 200 UG/1
800 TABLET ORAL ONCE AS NEEDED
Status: DISCONTINUED | OUTPATIENT
Start: 2024-01-01 | End: 2024-01-03 | Stop reason: HOSPADM

## 2024-01-01 RX ORDER — METHYLERGONOVINE MALEATE 0.2 MG/ML
0.2 INJECTION INTRAVENOUS ONCE AS NEEDED
Status: DISCONTINUED | OUTPATIENT
Start: 2024-01-01 | End: 2024-01-03 | Stop reason: HOSPADM

## 2024-01-01 RX ORDER — LOPERAMIDE HYDROCHLORIDE 2 MG/1
4 CAPSULE ORAL EVERY 2 HOUR PRN
Status: DISCONTINUED | OUTPATIENT
Start: 2024-01-01 | End: 2024-01-01

## 2024-01-01 RX ORDER — OXYTOCIN/0.9 % SODIUM CHLORIDE 30/500 ML
2-30 PLASTIC BAG, INJECTION (ML) INTRAVENOUS CONTINUOUS
Status: DISCONTINUED | OUTPATIENT
Start: 2024-01-01 | End: 2024-01-01

## 2024-01-01 RX ORDER — ONDANSETRON HYDROCHLORIDE 2 MG/ML
4 INJECTION, SOLUTION INTRAVENOUS EVERY 6 HOURS PRN
Status: DISCONTINUED | OUTPATIENT
Start: 2024-01-01 | End: 2024-01-01

## 2024-01-01 RX ORDER — TERBUTALINE SULFATE 1 MG/ML
0.25 INJECTION SUBCUTANEOUS ONCE AS NEEDED
Status: DISCONTINUED | OUTPATIENT
Start: 2024-01-01 | End: 2024-01-01

## 2024-01-01 RX ORDER — ONDANSETRON 4 MG/1
4 TABLET, FILM COATED ORAL EVERY 6 HOURS PRN
Status: DISCONTINUED | OUTPATIENT
Start: 2024-01-01 | End: 2024-01-03 | Stop reason: HOSPADM

## 2024-01-01 RX ORDER — POLYETHYLENE GLYCOL 3350 17 G/17G
17 POWDER, FOR SOLUTION ORAL 2 TIMES DAILY PRN
Status: DISCONTINUED | OUTPATIENT
Start: 2024-01-01 | End: 2024-01-03 | Stop reason: HOSPADM

## 2024-01-01 RX ORDER — DIPHENHYDRAMINE HCL 25 MG
25 CAPSULE ORAL EVERY 6 HOURS PRN
Status: DISCONTINUED | OUTPATIENT
Start: 2024-01-01 | End: 2024-01-03 | Stop reason: HOSPADM

## 2024-01-01 RX ADMIN — ACETAMINOPHEN 975 MG: 325 TABLET ORAL at 21:17

## 2024-01-01 RX ADMIN — PENICILLIN G 3 MILLION UNITS: 3000000 INJECTION, SOLUTION INTRAVENOUS at 16:45

## 2024-01-01 RX ADMIN — Medication 2 MILLI-UNITS/MIN: at 15:42

## 2024-01-01 RX ADMIN — Medication: at 18:12

## 2024-01-01 RX ADMIN — SODIUM CHLORIDE 5 MILLION UNITS: 900 INJECTION INTRAVENOUS at 12:39

## 2024-01-01 RX ADMIN — BENZOCAINE AND LEVOMENTHOL 1 APPLICATION: 200; 5 SPRAY TOPICAL at 21:16

## 2024-01-01 RX ADMIN — IBUPROFEN 600 MG: 600 TABLET, FILM COATED ORAL at 21:17

## 2024-01-01 RX ADMIN — BETAMETHASONE SODIUM PHOSPHATE AND BETAMETHASONE ACETATE 12 MG: 3; 3 INJECTION, SUSPENSION INTRA-ARTICULAR; INTRALESIONAL; INTRAMUSCULAR at 12:42

## 2024-01-01 SDOH — SOCIAL STABILITY: SOCIAL INSECURITY: WITHIN THE LAST YEAR, HAVE YOU BEEN HUMILIATED OR EMOTIONALLY ABUSED IN OTHER WAYS BY YOUR PARTNER OR EX-PARTNER?: NO

## 2024-01-01 SDOH — ECONOMIC STABILITY: HOUSING INSECURITY: DO YOU FEEL UNSAFE GOING BACK TO THE PLACE WHERE YOU ARE LIVING?: NO

## 2024-01-01 SDOH — ECONOMIC STABILITY: FOOD INSECURITY: WITHIN THE PAST 12 MONTHS, YOU WORRIED THAT YOUR FOOD WOULD RUN OUT BEFORE YOU GOT MONEY TO BUY MORE.: NEVER TRUE

## 2024-01-01 SDOH — HEALTH STABILITY: PHYSICAL HEALTH: ON AVERAGE, HOW MANY MINUTES DO YOU ENGAGE IN EXERCISE AT THIS LEVEL?: 20 MIN

## 2024-01-01 SDOH — SOCIAL STABILITY: SOCIAL INSECURITY: DOES ANYONE TRY TO KEEP YOU FROM HAVING/CONTACTING OTHER FRIENDS OR DOING THINGS OUTSIDE YOUR HOME?: NO

## 2024-01-01 SDOH — HEALTH STABILITY: MENTAL HEALTH: SUICIDAL BEHAVIOR (LIFETIME): NO

## 2024-01-01 SDOH — HEALTH STABILITY: MENTAL HEALTH: HOW OFTEN DO YOU HAVE A DRINK CONTAINING ALCOHOL?: NEVER

## 2024-01-01 SDOH — HEALTH STABILITY: MENTAL HEALTH
STRESS IS WHEN SOMEONE FEELS TENSE, NERVOUS, ANXIOUS, OR CAN'T SLEEP AT NIGHT BECAUSE THEIR MIND IS TROUBLED. HOW STRESSED ARE YOU?: NOT AT ALL

## 2024-01-01 SDOH — HEALTH STABILITY: MENTAL HEALTH: HAVE YOU USED ANY PRESCRIPTION DRUGS OTHER THAN PRESCRIBED IN THE PAST 12 MONTHS?: NO

## 2024-01-01 SDOH — SOCIAL STABILITY: SOCIAL NETWORK
IN A TYPICAL WEEK, HOW MANY TIMES DO YOU TALK ON THE PHONE WITH FAMILY, FRIENDS, OR NEIGHBORS?: MORE THAN THREE TIMES A WEEK

## 2024-01-01 SDOH — SOCIAL STABILITY: SOCIAL INSECURITY: HAS ANYONE EVER THREATENED TO HURT YOUR FAMILY OR YOUR PETS?: NO

## 2024-01-01 SDOH — HEALTH STABILITY: PHYSICAL HEALTH: ON AVERAGE, HOW MANY DAYS PER WEEK DO YOU ENGAGE IN MODERATE TO STRENUOUS EXERCISE (LIKE A BRISK WALK)?: 3 DAYS

## 2024-01-01 SDOH — HEALTH STABILITY: MENTAL HEALTH: HOW OFTEN DO YOU HAVE 6 OR MORE DRINKS ON ONE OCCASION?: NEVER

## 2024-01-01 SDOH — ECONOMIC STABILITY: FOOD INSECURITY: WITHIN THE PAST 12 MONTHS, THE FOOD YOU BOUGHT JUST DIDN'T LAST AND YOU DIDN'T HAVE MONEY TO GET MORE.: NEVER TRUE

## 2024-01-01 SDOH — SOCIAL STABILITY: SOCIAL INSECURITY: DO YOU FEEL ANYONE HAS EXPLOITED OR TAKEN ADVANTAGE OF YOU FINANCIALLY OR OF YOUR PERSONAL PROPERTY?: NO

## 2024-01-01 SDOH — SOCIAL STABILITY: SOCIAL INSECURITY: ARE YOU OR HAVE YOU BEEN THREATENED OR ABUSED PHYSICALLY, EMOTIONALLY, OR SEXUALLY BY ANYONE?: NO

## 2024-01-01 SDOH — HEALTH STABILITY: MENTAL HEALTH: NON-SPECIFIC ACTIVE SUICIDAL THOUGHTS (PAST 1 MONTH): NO

## 2024-01-01 SDOH — SOCIAL STABILITY: SOCIAL INSECURITY
WITHIN THE LAST YEAR, HAVE YOU BEEN KICKED, HIT, SLAPPED, OR OTHERWISE PHYSICALLY HURT BY YOUR PARTNER OR EX-PARTNER?: NO

## 2024-01-01 SDOH — ECONOMIC STABILITY: TRANSPORTATION INSECURITY
IN THE PAST 12 MONTHS, HAS LACK OF TRANSPORTATION KEPT YOU FROM MEETINGS, WORK, OR FROM GETTING THINGS NEEDED FOR DAILY LIVING?: NO

## 2024-01-01 SDOH — SOCIAL STABILITY: SOCIAL INSECURITY: ABUSE SCREEN: ADULT

## 2024-01-01 SDOH — SOCIAL STABILITY: SOCIAL INSECURITY: WITHIN THE LAST YEAR, HAVE YOU BEEN AFRAID OF YOUR PARTNER OR EX-PARTNER?: NO

## 2024-01-01 SDOH — SOCIAL STABILITY: SOCIAL INSECURITY: VERBAL ABUSE: DENIES

## 2024-01-01 SDOH — SOCIAL STABILITY: SOCIAL INSECURITY
WITHIN THE LAST YEAR, HAVE TO BEEN RAPED OR FORCED TO HAVE ANY KIND OF SEXUAL ACTIVITY BY YOUR PARTNER OR EX-PARTNER?: NO

## 2024-01-01 SDOH — SOCIAL STABILITY: SOCIAL NETWORK: HOW OFTEN DO YOU GET TOGETHER WITH FRIENDS OR RELATIVES?: THREE TIMES A WEEK

## 2024-01-01 SDOH — HEALTH STABILITY: MENTAL HEALTH: WERE YOU ABLE TO COMPLETE ALL THE BEHAVIORAL HEALTH SCREENINGS?: YES

## 2024-01-01 SDOH — HEALTH STABILITY: MENTAL HEALTH: HOW MANY STANDARD DRINKS CONTAINING ALCOHOL DO YOU HAVE ON A TYPICAL DAY?: PATIENT DOES NOT DRINK

## 2024-01-01 SDOH — SOCIAL STABILITY: SOCIAL NETWORK: ARE YOU MARRIED, WIDOWED, DIVORCED, SEPARATED, NEVER MARRIED, OR LIVING WITH A PARTNER?: MARRIED

## 2024-01-01 SDOH — ECONOMIC STABILITY: INCOME INSECURITY: HOW HARD IS IT FOR YOU TO PAY FOR THE VERY BASICS LIKE FOOD, HOUSING, MEDICAL CARE, AND HEATING?: NOT HARD AT ALL

## 2024-01-01 SDOH — SOCIAL STABILITY: SOCIAL INSECURITY: ARE THERE ANY APPARENT SIGNS OF INJURIES/BEHAVIORS THAT COULD BE RELATED TO ABUSE/NEGLECT?: NO

## 2024-01-01 SDOH — SOCIAL STABILITY: SOCIAL INSECURITY: HAVE YOU HAD THOUGHTS OF HARMING ANYONE ELSE?: NO

## 2024-01-01 SDOH — HEALTH STABILITY: MENTAL HEALTH: WISH TO BE DEAD (PAST 1 MONTH): NO

## 2024-01-01 SDOH — SOCIAL STABILITY: SOCIAL INSECURITY: PHYSICAL ABUSE: DENIES

## 2024-01-01 SDOH — HEALTH STABILITY: MENTAL HEALTH: HAVE YOU USED ANY SUBSTANCES (CANABIS, COCAINE, HEROIN, HALLUCINOGENS, INHALANTS, ETC.) IN THE PAST 12 MONTHS?: NO

## 2024-01-01 SDOH — ECONOMIC STABILITY: TRANSPORTATION INSECURITY
IN THE PAST 12 MONTHS, HAS THE LACK OF TRANSPORTATION KEPT YOU FROM MEDICAL APPOINTMENTS OR FROM GETTING MEDICATIONS?: NO

## 2024-01-01 ASSESSMENT — ACTIVITIES OF DAILY LIVING (ADL)
PATIENT'S MEMORY ADEQUATE TO SAFELY COMPLETE DAILY ACTIVITIES?: YES
HEARING - RIGHT EAR: FUNCTIONAL
HEARING - LEFT EAR: FUNCTIONAL
JUDGMENT_ADEQUATE_SAFELY_COMPLETE_DAILY_ACTIVITIES: YES
ASSISTIVE_DEVICE: EYEGLASSES
DRESSING YOURSELF: DEPENDENT
LACK_OF_TRANSPORTATION: NO
BATHING: DEPENDENT
TOILETING: DEPENDENT
WALKS IN HOME: DEPENDENT
ADEQUATE_TO_COMPLETE_ADL: YES
GROOMING: DEPENDENT
FEEDING YOURSELF: DEPENDENT

## 2024-01-01 ASSESSMENT — PAIN SCALES - GENERAL
PAINLEVEL_OUTOF10: 7
PAINLEVEL_OUTOF10: 1
PAINLEVEL: 1
PAINLEVEL_OUTOF10: 4
PAINLEVEL_OUTOF10: 4
PAINLEVEL_OUTOF10: 9

## 2024-01-01 ASSESSMENT — LIFESTYLE VARIABLES
SKIP TO QUESTIONS 9-10: 1
AUDIT-C TOTAL SCORE: 0
AUDIT-C TOTAL SCORE: 0
HOW OFTEN DO YOU HAVE 6 OR MORE DRINKS ON ONE OCCASION: NEVER
HOW OFTEN DO YOU HAVE A DRINK CONTAINING ALCOHOL: NEVER
SKIP TO QUESTIONS 9-10: 1
HOW MANY STANDARD DRINKS CONTAINING ALCOHOL DO YOU HAVE ON A TYPICAL DAY: PATIENT DOES NOT DRINK
AUDIT-C TOTAL SCORE: 0

## 2024-01-01 ASSESSMENT — PAIN DESCRIPTION - DESCRIPTORS
DESCRIPTORS: SORE
DESCRIPTORS: SORE

## 2024-01-01 ASSESSMENT — PATIENT HEALTH QUESTIONNAIRE - PHQ9
1. LITTLE INTEREST OR PLEASURE IN DOING THINGS: NOT AT ALL
2. FEELING DOWN, DEPRESSED OR HOPELESS: NOT AT ALL
SUM OF ALL RESPONSES TO PHQ9 QUESTIONS 1 & 2: 0

## 2024-01-01 NOTE — H&P
Obstetrical Admission History and Physical    Chief Complaint: Leakage/Loss of Fluid (Big gush at 0330 - clear fluid/Still leaking clear fluid at this time)    Subjective    Luba Middleton is a 33 y.o.  at 36w0d. Estimated Date of Delivery: 24 Presenting for rupture of membranes. Patient reports that at 0330 she got up to use the bathroom and had a large gush of fluid. Patient states that she has continued to leak since. Patient reports mild irregular contractions, pink discharge, and good fetal movement.     Pregnancy Complications:   Previous PLTCS  Hx of gHTN with last birth    Obstetrical History   # 1 - Date: , Sex: None, Weight: None, GA: None, Delivery: None, Apgar1: None, Apgar5: None, Living: None, Birth Comments: None    # 2 - Date: , Sex: None, Weight: None, GA: None, Delivery: None, Apgar1: None, Apgar5: None, Living: None, Birth Comments: None    # 3 - Date: , Sex: None, Weight: None, GA: None, Delivery: None, Apgar1: None, Apgar5: None, Living: None, Birth Comments: None    # 4 - Date: 21, Sex: None, Weight: None, GA: 37w4d, Delivery: , Low Transverse, Apgar1: None, Apgar5: None, Living: Living, Birth Comments: None    # 5 - Date: None, Sex: None, Weight: None, GA: None, Delivery: None, Apgar1: None, Apgar5: None, Living: None, Birth Comments: None    Gynecological History  STD/HIV Risks: none  Pap Smears: no abnormalities    Past Medical History  Mild intermittent asthma when ill. ADHD on adderall.     Past Surgical History    section and D and C's  Social History  The patient reports that she has never smoked. She has never used smokeless tobacco. She reports that she does not drink alcohol and does not use drugs.     Family History  Reviewed and not pertinent to current stay    Genetic History  rrNIPS      Allergies  Patient has no known allergies.     Medications  Medications Prior to Admission   Medication Sig Dispense Refill Last Dose     "amphetamine-dextroamphetamine (Adderall) 30 mg tablet Take by mouth.   12/31/2023    aspirin 81 mg EC tablet Take 2 tablets (162 mg) by mouth once daily.   12/31/2023    omega 3,6/dha/blake/Schizo/Mort (ENFAMIL DHA-BLAKE SUPPLEMENT ORAL) Take by mouth.   Unknown    PNV133-ferrous fumarate-FA (Prenatal)  mg-mcg tablet Take by mouth.   1/1/2024        Review of Systems:  Pertinent items are noted in HPI.    Objective    Physical Examination  Vital Signs:  /88   Pulse 96   Temp 37.1 °C (98.7 °F) (Oral)   Resp 18   Ht 1.6 m (5' 2.99\")   Wt 72.6 kg (159 lb 15.1 oz)   BMI 28.34 kg/m²    GENERAL: Examination reveals a well developed, well nourished, gravid female in no acute distress. She is alert and cooperative.  HEENT: conjunctiva clear.   LUNGS: clear to auscultation bilaterally.  HEART: regular rate and rhythm, S1, S2 normal, no murmur, click, rub or gallop  ABDOMEN: soft, gravid, nontender, nondistended, no abnormal masses, no epigastric pain.  PELVIC:       Vagina:  SSE performed. Clear non- odorous fluid noted on perineum, negative cough, + nitrazine, + fern. Cervix appears closed and long.    EXTREMITIES: no redness or tenderness in the calves or thighs, no edema. Good muscle tone with no atrophy.  SKIN: normal coloration and turgor, no rashes.  NEUROLOGICAL: reflexes are DTRs normal and symmetrical. Normal sensation in all extremities.  PSYCHOLOGICAL: mood normal    Cervical Exam  Dilation: Closed  Presentation: Vertex  Method: Ultrasound  OB Examiner: Isabel Pardo CNM and Dr. Do    Membranes  Membrane Status: SROM  Sac Identifier: Sac 1  Rupture Date: 01/01/24  Rupture Time: 0330  Fluid Color: Clear  Fluid Odor: None  Fluid Amount: Small    Fetal Assessment  Movement: Present  Mode: External US  Baseline Fetal Heart Rate (bpm): 140 bpm  Baseline Classification: Normal  Variability: Moderate (Between 6 and 25 BPM)  Pattern: Accelerations  FHR Category: Category I  Multiple Births: " No    Contraction Frequency: none  Contraction Quality: Not applicable    Estimated fetal weight: 6# by leopold's.     Lab Review:  Results for orders placed or performed in visit on 12/15/23   Comprehensive metabolic panel   Result Value Ref Range    Glucose 64 (L) 74 - 99 mg/dL    Sodium 136 136 - 145 mmol/L    Potassium 4.2 3.5 - 5.3 mmol/L    Chloride 104 98 - 107 mmol/L    Bicarbonate 25 21 - 32 mmol/L    Anion Gap 11 10 - 20 mmol/L    Urea Nitrogen 9 6 - 23 mg/dL    Creatinine 0.72 0.50 - 1.05 mg/dL    eGFR >90 >60 mL/min/1.73m*2    Calcium 8.5 (L) 8.6 - 10.3 mg/dL    Albumin 3.2 (L) 3.4 - 5.0 g/dL    Alkaline Phosphatase 97 33 - 110 U/L    Total Protein 6.1 (L) 6.4 - 8.2 g/dL    AST 15 9 - 39 U/L    Bilirubin, Total 1.1 0.0 - 1.2 mg/dL    ALT 10 7 - 45 U/L   CBC   Result Value Ref Range    WBC 9.0 4.4 - 11.3 x10*3/uL    nRBC 0.0 0.0 - 0.0 /100 WBCs    RBC 4.04 4.00 - 5.20 x10*6/uL    Hemoglobin 11.5 (L) 12.0 - 16.0 g/dL    Hematocrit 35.5 (L) 36.0 - 46.0 %    MCV 88 80 - 100 fL    MCH 28.5 26.0 - 34.0 pg    MCHC 32.4 32.0 - 36.0 g/dL    RDW 12.4 11.5 - 14.5 %    Platelets 281 150 - 450 x10*3/uL   Protein, Urine Random   Result Value Ref Range    Total Protein, Urine Random 10 5 - 24 mg/dL    Creatinine, Urine Random 109.3 20.0 - 320.0 mg/dL    T. Protein/Creatinine Ratio 0.09 0.00 - 0.17 mg/mg Creat      Mom Prenatal Results      Prenatal Results      1st Trimester       Test Value Reference Range Date Time    CBC w/ Reflex  Abnormal   (See Report)    11/01/23 1523    HGB  11.5 g/dL 12.0 - 16.0 12/15/23 1431       11.2 g/dL 12.0 - 16.0 11/01/23 1523       12.8 g/dL 12.0 - 16.0 07/06/23 0902    HCT  35.5 % 36.0 - 46.0 12/15/23 1431       33.8 % 36.0 - 46.0 11/01/23 1523       38.5 % 36.0 - 46.0 07/06/23 0902    Platelet Count  281 x10*3/uL 150 - 450 12/15/23 1431       264 x10*3/uL 150 - 450 11/01/23 1523       259 x10E9/L 150 - 450 07/06/23 0902    MCV  88 fL 80 - 100 12/15/23 1431       91 fL 80 - 100  11/01/23 1523       88 fL 80 - 100 07/06/23 0902    Blood type (ABO)  CANCELED   07/06/23 0902       O   07/06/23 0902    Rh Type  CANCELED   07/06/23 0902       POS   07/06/23 0902    Antibody Screen  CANCELED   07/06/23 0902       NEG   07/06/23 0902    Hemoglobin Identification        Manual Hemoglobin Identification        Chlamydia and Gonorrhea Screening  (See Report)    08/11/23 1147       (See Report)    07/06/23 0902    Chlamydia Screen  NEGATIVE  Negative 08/11/23 1147       CANCELED   07/06/23 0902    Gonorrhea Screen  NEGATIVE  Negative 08/11/23 1147       CANCELED   07/06/23 0902    Syphilis Screening w/ Reflex  NONREACTIVE  NONREACTIVE 07/06/23 0902    Rubella  POSITIVE   07/06/23 0902    Hep C  NONREACTIVE  NONREACTIVE 07/06/23 0902    Urine Culture  NO GROWTH   08/11/23 1147       CANCELED   07/06/23 0902    Map Positive Urine GBS for Storyboard        P/C Ratio  0.09 mg/mg Creat 0.00 - 0.17 12/15/23 1431    HBsAg  NONREACTIVE  NONREACTIVE 07/06/23 0902    HIV-1 and HIV-2 Antibodies  NONREACTIVE  NONREACTIVE 07/06/23 0902    TSH with Reflex         T4 Free        Varicella        Pap Smear        HbA1c        1 Hour Glucola  74 mg/dL <135 11/01/23 1523    Fasting Glucose Tolerance 3 hour        GTT, 1 hour        GTT, 2 hour        GTT, 3 hour        cfDNA              2nd Trimester       Test Value Reference Range Date Time    CBC w/ Reflex  Abnormal   (See Report)    11/01/23 1523    HGB  11.5 g/dL 12.0 - 16.0 12/15/23 1431       11.2 g/dL 12.0 - 16.0 11/01/23 1523       12.8 g/dL 12.0 - 16.0 07/06/23 0902    HCT  35.5 % 36.0 - 46.0 12/15/23 1431       33.8 % 36.0 - 46.0 11/01/23 1523       38.5 % 36.0 - 46.0 07/06/23 0902    Platelet Count  281 x10*3/uL 150 - 450 12/15/23 1431       264 x10*3/uL 150 - 450 11/01/23 1523       259 x10E9/L 150 - 450 07/06/23 0902    MCV  88 fL 80 - 100 12/15/23 1431       91 fL 80 - 100 11/01/23 1523       88 fL 80 - 100 07/06/23 0902    Blood Type (ABO)        Rh  Type        Antibody Screen  CANCELED   23 0902       NEG   23 0902    Chlamydia and Gohorrhea Screening  (See Report)    23 1147       (See Report)    23 0902    Chlamydia Screen  NEGATIVE  Negative 23 1147       CANCELED   23 0902    Gonorrhea Screen  NEGATIVE  Negative 23 1147       CANCELED   23 0902    Syphilis Screening w/ Reflex  NONREACTIVE  NONREACTIVE 23 0902    HbA1c        1 Hour Glucola  74 mg/dL <135 23 1523    Fasting Glucose Tolerance 3 hour        GTT, 1 hour        GTT, 2 hours        GTT, 3 hours              3rd Trimester       Test Value Reference Range Date Time    GBS        CBC w/ Reflex  Abnormal   (See Report)    23 1523    HGB        HCT        Platelet Count        MCV              Legend    ^: Historical                         Assessment/Plan   Luba Middleton is a 33 y.o.  at 36w0d. Estimated Date of Delivery: 24  PPROM   TOLAC  Fetal Heart Tracing Category one    Plan:   Admit for  PPROM  Routine orders and labs  Fetal monitoring CEFM  GBS - unknown and collected, will treat with PCN as patient has NKDA  Betamethasone 12 mg x1 and repeat in 24 hours for late   Type and cross  for 1 unit for augmentation and prior  section  ROM X 8 hours  Patient strongly desires NCB and low intervention. Discussed with patient recommendation for IOL with Oxytocin in the setting of PPROM to decrease the risk of IAI and endometritis. Patient would like to attempt nipple simulation at this time, though will be amenable to augmentation after 12 hours of rupture  Pain management as desired  POC reviewed with Dr. Ordoñez who is in agreement with POC    COLTON Perez

## 2024-01-01 NOTE — HOSPITAL COURSE
Luba Middleton is a 33 y.o.  at 36w0d. Estimated Date of Delivery: 24  PPROM 24 @ 0330, clear fluid  TOLAC  Initial h/h 10.9/33.7    Betamethasone 1240, PCN started 1240    1812- /91- mild pressure noted antepartum this is her second elevation, criteria met for gHTN    Labor course  24  1130- visually closed and long, + fern, clear non- odorous fluid  1600- pitocin started  1840- 2-3/100/-1  1915- 10100/+2  NSVB @ 1925, male. Vaginal, right labial, and first degree lac. - CBC ordered for AM    24 PPD#1  AM CBC 9.6/30.3  1030- Sepsis criteria met secondary to HR >110 and WBC 27.6; labs drawn and plan to repeat in 6 hours  PE largely normal without localizing symptoms of infection, per Dr. Mcwilliams, however IV Vanc/Zosyn started d/t risk factors for endometritis.     1641- AST/ALT: WNL  Lactate: Improved from 2.8 to 0.9  WBC: 27.6 to 21.5  Creatinine: 0.75 to 1.00  H/H 9.6/30.3 down to 7.6/23.5 --> patient asymptomatic  Pt offered IV Venofer, she declined  Antibiotics discontinued    1/3/24 PPD#2  0529 H/H 7.1/22.2  WBC 18.2  Recommended IV Venofer again, she accepts x1 dose prior to discharge

## 2024-01-01 NOTE — PROGRESS NOTES
Labor Progress Note    Subjective:  Luba Middleton reports overall doing well. Patient reports mild lower uterine cramping    Objective:  Vitals  Temp:  [36.8 °C (98.2 °F)-37.1 °C (98.8 °F)] 36.8 °C (98.2 °F)  Heart Rate:  [79-96] 79  Resp:  [16-18] 16  BP: (131-137)/(84-88) 135/84     Cervical Exam  Dilation: Closed  Presentation: Vertex  Method: Ultrasound  OB Examiner: Isabel Pardo CNM and Dr. Do    Membranes  Membrane Status: SROM  Sac Identifier: Sac 1  Rupture Date: 24  Rupture Time: 0330  Fluid Color: Clear  Fluid Odor: None  Fluid Amount: Small  Time since ROM: 13 hours    Fetal Assessment  Movement: Present  Mode: External US  Baseline Fetal Heart Rate (bpm): 125 bpm  Baseline Classification: Normal  Variability: Moderate (Between 6 and 25 BPM)  Pattern: Accelerations  FHR Category: Category I  Multiple Births: No    Contractions mild, 4-5 minutes    Assessment/Plan:  33 y.o.  at 36w0d  Active Problems:     premature rupture of membranes (PPROM) with unknown onset of labor  TOLAC     Latent labor  FHT Category 1  Continue Pitocin augmentation at this time and titrate per guidelines.   Pain medication as desired  Anticipate     COLTON Perez

## 2024-01-01 NOTE — PROGRESS NOTES
Labor Progress Note    Subjective:  Luba Middleton breathing through contractions with use of nitrous and reporting rectal pressure. Patient desires cervical exam.     Objective:  Vitals  Temp:  [36.8 °C (98.2 °F)-37.2 °C (99 °F)] 37.2 °C (99 °F)  Heart Rate:  [69-96] 69  Resp:  [16-18] 18  BP: (125-142)/(76-91) 142/91 2nd mild range BP of pregnancy noted    CE- 2.5/100/-1    Membranes  Membrane Status: SROM  Sac Identifier: Sac 1  Rupture Date: 24  Rupture Time: 0330  Fluid Color: Clear  Fluid Odor: None  Fluid Amount: Small  Time since ROM: 15 hours    Fetal Assessment  Movement: Present  Mode: External US  Baseline Fetal Heart Rate (bpm): 120 bpm  Baseline Classification: Normal  Variability: Moderate (Between 6 and 25 BPM)  Pattern: Accelerations  FHR Category: Category I  Multiple Births: No    Contractions- 2-3, moderate to palpation    Assessment/Plan:  33 y.o.  at 36w0d  Active Problems:     premature rupture of membranes (PPROM) with unknown onset of labor  TOLAC  gHTN, second mild range BP in pregnancy noted diagnostic of gHTN- CMP and LDH ordered   Active labor  FHT Category 1  Pitocin x 3 hours, currently at 6 mu with adequate contractions  Continue to monitor labor progress PRN.  Anticipate     Isabel Pardo, APRN-CNM

## 2024-01-01 NOTE — NURSING NOTE
1732 - Isabel Pardo CNM aware of pt request to maintain pitocin at 6mu at this time    1737 - RN at bedside attempting to adjust FHR monitor. Pt leaning forward and monitor is picking up mothers HR.     1740 - pt up to use restroom.    1750 - RN and Isabel Pardo CNM continuously at bedside attempting to adjust FHR monitor and for pt support    1910 - bedside report given to Adriana Zhou RN.    used

## 2024-01-02 PROBLEM — Z87.59 HISTORY OF GESTATIONAL HYPERTENSION: Status: RESOLVED | Noted: 2023-10-05 | Resolved: 2024-01-02

## 2024-01-02 PROBLEM — J45.909 ASTHMA AFFECTING PREGNANCY IN THIRD TRIMESTER (HHS-HCC): Status: RESOLVED | Noted: 2023-11-08 | Resolved: 2024-01-02

## 2024-01-02 PROBLEM — Z34.80 SUPERVISION OF OTHER NORMAL PREGNANCY, ANTEPARTUM (HHS-HCC): Status: RESOLVED | Noted: 2023-11-08 | Resolved: 2024-01-02

## 2024-01-02 PROBLEM — O99.513 ASTHMA AFFECTING PREGNANCY IN THIRD TRIMESTER (HHS-HCC): Status: RESOLVED | Noted: 2023-11-08 | Resolved: 2024-01-02

## 2024-01-02 LAB
ALBUMIN SERPL BCP-MCNC: 2.6 G/DL (ref 3.4–5)
ALBUMIN SERPL BCP-MCNC: 2.8 G/DL (ref 3.4–5)
ALP SERPL-CCNC: 74 U/L (ref 33–110)
ALP SERPL-CCNC: 85 U/L (ref 33–110)
ALT SERPL W P-5'-P-CCNC: 8 U/L (ref 7–45)
ALT SERPL W P-5'-P-CCNC: 8 U/L (ref 7–45)
ANION GAP SERPL CALC-SCNC: 14 MMOL/L (ref 10–20)
ANION GAP SERPL CALC-SCNC: 9 MMOL/L (ref 10–20)
APPEARANCE UR: CLEAR
APTT PPP: 26 SECONDS (ref 27–38)
APTT PPP: 26 SECONDS (ref 27–38)
AST SERPL W P-5'-P-CCNC: 17 U/L (ref 9–39)
AST SERPL W P-5'-P-CCNC: 19 U/L (ref 9–39)
BILIRUB SERPL-MCNC: 0.4 MG/DL (ref 0–1.2)
BILIRUB SERPL-MCNC: 0.6 MG/DL (ref 0–1.2)
BILIRUB UR STRIP.AUTO-MCNC: NEGATIVE MG/DL
BUN SERPL-MCNC: 12 MG/DL (ref 6–23)
BUN SERPL-MCNC: 9 MG/DL (ref 6–23)
CALCIUM SERPL-MCNC: 8.1 MG/DL (ref 8.6–10.3)
CALCIUM SERPL-MCNC: 8.1 MG/DL (ref 8.6–10.3)
CAOX CRY #/AREA UR COMP ASSIST: ABNORMAL /HPF
CHLORIDE SERPL-SCNC: 104 MMOL/L (ref 98–107)
CHLORIDE SERPL-SCNC: 107 MMOL/L (ref 98–107)
CO2 SERPL-SCNC: 19 MMOL/L (ref 21–32)
CO2 SERPL-SCNC: 25 MMOL/L (ref 21–32)
COLOR UR: ABNORMAL
CREAT SERPL-MCNC: 0.75 MG/DL (ref 0.5–1.05)
CREAT SERPL-MCNC: 1 MG/DL (ref 0.5–1.05)
ERYTHROCYTE [DISTWIDTH] IN BLOOD BY AUTOMATED COUNT: 13.2 % (ref 11.5–14.5)
ERYTHROCYTE [DISTWIDTH] IN BLOOD BY AUTOMATED COUNT: 13.4 % (ref 11.5–14.5)
FIBRINOGEN PPP-MCNC: 298 MG/DL (ref 200–400)
GFR SERPL CREATININE-BSD FRML MDRD: 75 ML/MIN/1.73M*2
GFR SERPL CREATININE-BSD FRML MDRD: >90 ML/MIN/1.73M*2
GLUCOSE SERPL-MCNC: 173 MG/DL (ref 74–99)
GLUCOSE SERPL-MCNC: 97 MG/DL (ref 74–99)
GLUCOSE UR STRIP.AUTO-MCNC: NEGATIVE MG/DL
HCT VFR BLD AUTO: 23.5 % (ref 36–46)
HCT VFR BLD AUTO: 30.3 % (ref 36–46)
HGB BLD-MCNC: 7.6 G/DL (ref 12–16)
HGB BLD-MCNC: 9.6 G/DL (ref 12–16)
INR PPP: 1 (ref 0.9–1.1)
INR PPP: 1 (ref 0.9–1.1)
KETONES UR STRIP.AUTO-MCNC: NEGATIVE MG/DL
LACTATE SERPL-SCNC: 0.9 MMOL/L (ref 0.4–2)
LACTATE SERPL-SCNC: 2.4 MMOL/L (ref 0.4–2)
LACTATE SERPL-SCNC: 2.8 MMOL/L (ref 0.4–2)
LEUKOCYTE ESTERASE UR QL STRIP.AUTO: ABNORMAL
MCH RBC QN AUTO: 27.2 PG (ref 26–34)
MCH RBC QN AUTO: 27.8 PG (ref 26–34)
MCHC RBC AUTO-ENTMCNC: 31.7 G/DL (ref 32–36)
MCHC RBC AUTO-ENTMCNC: 32.3 G/DL (ref 32–36)
MCV RBC AUTO: 86 FL (ref 80–100)
MCV RBC AUTO: 86 FL (ref 80–100)
NITRITE UR QL STRIP.AUTO: NEGATIVE
NRBC BLD-RTO: 0 /100 WBCS (ref 0–0)
NRBC BLD-RTO: 0 /100 WBCS (ref 0–0)
PH UR STRIP.AUTO: 6 [PH]
PLATELET # BLD AUTO: 187 X10*3/UL (ref 150–450)
PLATELET # BLD AUTO: 222 X10*3/UL (ref 150–450)
POTASSIUM SERPL-SCNC: 3.6 MMOL/L (ref 3.5–5.3)
POTASSIUM SERPL-SCNC: 4.2 MMOL/L (ref 3.5–5.3)
PROT SERPL-MCNC: 5.1 G/DL (ref 6.4–8.2)
PROT SERPL-MCNC: 5.6 G/DL (ref 6.4–8.2)
PROT UR STRIP.AUTO-MCNC: NEGATIVE MG/DL
PROTHROMBIN TIME: 11.6 SECONDS (ref 9.8–12.8)
PROTHROMBIN TIME: 11.7 SECONDS (ref 9.8–12.8)
RBC # BLD AUTO: 2.73 X10*6/UL (ref 4–5.2)
RBC # BLD AUTO: 3.53 X10*6/UL (ref 4–5.2)
RBC # UR STRIP.AUTO: ABNORMAL /UL
RBC #/AREA URNS AUTO: >20 /HPF
SODIUM SERPL-SCNC: 133 MMOL/L (ref 136–145)
SODIUM SERPL-SCNC: 137 MMOL/L (ref 136–145)
SP GR UR STRIP.AUTO: 1.01
SQUAMOUS #/AREA URNS AUTO: ABNORMAL /HPF
T PALLIDUM AB SER QL: NONREACTIVE
UROBILINOGEN UR STRIP.AUTO-MCNC: <2 MG/DL
WBC # BLD AUTO: 21.5 X10*3/UL (ref 4.4–11.3)
WBC # BLD AUTO: 27.6 X10*3/UL (ref 4.4–11.3)
WBC #/AREA URNS AUTO: ABNORMAL /HPF

## 2024-01-02 PROCEDURE — 2500000004 HC RX 250 GENERAL PHARMACY W/ HCPCS (ALT 636 FOR OP/ED): Performed by: MIDWIFE

## 2024-01-02 PROCEDURE — 1120000001 HC OB PRIVATE ROOM DAILY

## 2024-01-02 PROCEDURE — 85027 COMPLETE CBC AUTOMATED: CPT | Performed by: MIDWIFE

## 2024-01-02 PROCEDURE — 87040 BLOOD CULTURE FOR BACTERIA: CPT | Mod: STJLAB | Performed by: MIDWIFE

## 2024-01-02 PROCEDURE — 85610 PROTHROMBIN TIME: CPT | Performed by: MIDWIFE

## 2024-01-02 PROCEDURE — 87086 URINE CULTURE/COLONY COUNT: CPT | Mod: STJLAB | Performed by: MIDWIFE

## 2024-01-02 PROCEDURE — 85027 COMPLETE CBC AUTOMATED: CPT

## 2024-01-02 PROCEDURE — 36415 COLL VENOUS BLD VENIPUNCTURE: CPT | Performed by: MIDWIFE

## 2024-01-02 PROCEDURE — 99232 SBSQ HOSP IP/OBS MODERATE 35: CPT | Performed by: MIDWIFE

## 2024-01-02 PROCEDURE — 80053 COMPREHEN METABOLIC PANEL: CPT | Performed by: MIDWIFE

## 2024-01-02 PROCEDURE — 81001 URINALYSIS AUTO W/SCOPE: CPT | Performed by: MIDWIFE

## 2024-01-02 PROCEDURE — 2500000001 HC RX 250 WO HCPCS SELF ADMINISTERED DRUGS (ALT 637 FOR MEDICARE OP)

## 2024-01-02 PROCEDURE — 83605 ASSAY OF LACTIC ACID: CPT | Performed by: MIDWIFE

## 2024-01-02 PROCEDURE — 2500000004 HC RX 250 GENERAL PHARMACY W/ HCPCS (ALT 636 FOR OP/ED)

## 2024-01-02 PROCEDURE — 36415 COLL VENOUS BLD VENIPUNCTURE: CPT

## 2024-01-02 PROCEDURE — 85384 FIBRINOGEN ACTIVITY: CPT | Performed by: MIDWIFE

## 2024-01-02 RX ORDER — VANCOMYCIN 2 GRAM/500 ML IN 0.9 % SODIUM CHLORIDE INTRAVENOUS
2 ONCE
Status: COMPLETED | OUTPATIENT
Start: 2024-01-02 | End: 2024-01-02

## 2024-01-02 RX ORDER — SODIUM CHLORIDE, SODIUM LACTATE, POTASSIUM CHLORIDE, CALCIUM CHLORIDE 600; 310; 30; 20 MG/100ML; MG/100ML; MG/100ML; MG/100ML
125 INJECTION, SOLUTION INTRAVENOUS CONTINUOUS
Status: DISCONTINUED | OUTPATIENT
Start: 2024-01-02 | End: 2024-01-03 | Stop reason: HOSPADM

## 2024-01-02 RX ADMIN — Medication 2 G: at 11:27

## 2024-01-02 RX ADMIN — SODIUM CHLORIDE, POTASSIUM CHLORIDE, SODIUM LACTATE AND CALCIUM CHLORIDE 125 ML/HR: 600; 310; 30; 20 INJECTION, SOLUTION INTRAVENOUS at 10:44

## 2024-01-02 RX ADMIN — IBUPROFEN 600 MG: 600 TABLET, FILM COATED ORAL at 23:14

## 2024-01-02 RX ADMIN — ACETAMINOPHEN 975 MG: 325 TABLET ORAL at 04:01

## 2024-01-02 RX ADMIN — ACETAMINOPHEN 975 MG: 325 TABLET ORAL at 16:50

## 2024-01-02 RX ADMIN — IBUPROFEN 600 MG: 600 TABLET, FILM COATED ORAL at 10:29

## 2024-01-02 RX ADMIN — PIPERACILLIN SODIUM AND TAZOBACTAM SODIUM 4.5 G: 4; .5 INJECTION, SOLUTION INTRAVENOUS at 10:44

## 2024-01-02 RX ADMIN — SODIUM CHLORIDE, POTASSIUM CHLORIDE, SODIUM LACTATE AND CALCIUM CHLORIDE 1000 ML: 600; 310; 30; 20 INJECTION, SOLUTION INTRAVENOUS at 16:58

## 2024-01-02 RX ADMIN — ACETAMINOPHEN 975 MG: 325 TABLET ORAL at 23:14

## 2024-01-02 RX ADMIN — IBUPROFEN 600 MG: 600 TABLET, FILM COATED ORAL at 16:49

## 2024-01-02 RX ADMIN — ACETAMINOPHEN 975 MG: 325 TABLET ORAL at 10:28

## 2024-01-02 RX ADMIN — POLYETHYLENE GLYCOL 3350 17 G: 17 POWDER, FOR SOLUTION ORAL at 16:50

## 2024-01-02 RX ADMIN — IBUPROFEN 600 MG: 600 TABLET, FILM COATED ORAL at 04:01

## 2024-01-02 ASSESSMENT — PAIN - FUNCTIONAL ASSESSMENT
PAIN_FUNCTIONAL_ASSESSMENT: 0-10
PAIN_FUNCTIONAL_ASSESSMENT: 0-10

## 2024-01-02 ASSESSMENT — PAIN SCALES - GENERAL
PAINLEVEL_OUTOF10: 2
PAINLEVEL_OUTOF10: 0 - NO PAIN
PAINLEVEL_OUTOF10: 0 - NO PAIN
PAINLEVEL_OUTOF10: 3
PAINLEVEL_OUTOF10: 2
PAINLEVEL_OUTOF10: 0 - NO PAIN

## 2024-01-02 ASSESSMENT — PAIN DESCRIPTION - LOCATION: LOCATION: ABDOMEN

## 2024-01-02 NOTE — CARE PLAN
Problem: Vaginal Birth or  Section  Goal: Fetal and maternal status remain reassuring during the birth process  Outcome: Met  Flowsheets (Taken 2024)  Fetal and maternal status remain reassuring during the birth process:   Monitor vital signs   Monitor fetal heart rate   Med administration/monitoring of effect   Monitor uterine activity   Monitor labor progression (Vaginal delivery)     Problem: Pain - Adult  Goal: Verbalizes/displays adequate comfort level or baseline comfort level  Outcome: Met  Flowsheets (Taken 2024)  Verbalizes/displays adequate comfort level or baseline comfort level:   Encourage patient to monitor pain and request assistance   Administer analgesics based on type and severity of pain and evaluate response   Consider cultural and social influences on pain and pain management   Assess pain using appropriate pain scale   Implement non-pharmacological measures as appropriate and evaluate response   Notify Licensed Independent Practitioner if interventions unsuccessful or patient reports new pain     Problem: Safety - Adult  Goal: Free from fall injury  Outcome: Met  Flowsheets (Taken 2024)  Free from fall injury:   Instruct family/caregiver on patient safety   Based on caregiver fall risk screen, instruct family/caregiver to ask for assistance with transferring infant if caregiver noted to have fall risk factors     The patient's goals for the shift include  pain control.     The clinical goals for the shift include healthy mom and baby delivery.

## 2024-01-02 NOTE — LACTATION NOTE
Lactation Consultant Note  Lactation Consultation  Reason for Consult: Initial assessment  Consultant Name: Luba Werner    Maternal Information  Has mother  before?: Yes  How long did the mother previously breastfeed?: 2 years  Previous Maternal Breastfeeding Challenges: Other (Comment) (Early term, sleepy at breast, pumped and supplemented)  Infant to breast within first 2 hours of birth?: Yes  Exclusive Pump and Bottle Feed: No    Maternal Assessment  Breast Assessment: Medium, Filling, Soft, Breast changes observed in pregnancy  Nipple Assessment: Intact, Erect  [place] Assessment: Normal    Infant Assessment  Infant Behavior: Sleepy, Rooting response, Suckles on and off, needs stimulation  Infant Assessment: Tongue protrudes over alveolar ridge, Good cupping of tongue    Feeding Assessment  Nutrition Source: Breastmilk, Donor human milk  Feeding Method: Nursing at the breast, Feeding expressed breastmilk, Syringe feeding  Feeding Position: Football/seated, Breast sandwich, Mother demonstrates good positioning  Suck/Feeding: Sustained, Baby led rhythmically, Audible swallowing  Latch Assessment: Instructed on deep latch, Latch achieved after repeated attempts, Wide open mouth < 160, Bursts of sucking, swallowing, and rest, Flanged lips, Comfortable latch    LATCH TOOL  Latch: Repeated attempts, hold nipple in mouth, stimulate to suck  Audible Swallowing: Spontaneous and intermittent (24 hours old)  Type of Nipple: Everted (After stimulation)  Comfort (Breast/Nipple): Soft/non-tender  Hold (Positioning): Minimal assist, teach one side, mother does other, staff holds  LATCH Score: 8    Breast Pump  Pump: Hospital grade electric pump  Frequency: 8-10 times per day  Duration: Initiate phase  Breast Shield Size and Type: 27 mm  Volume of Milk Production: 10  Units of Volume: mL per session    Other OB Lactation Tools       Patient Follow-up  Inpatient Lactation Follow-up Needed : Yes  Outpatient Lactation  "Follow-up: Recommended    Other OB Lactation Documentation  Maternal Risk Factors: Hypertension,  delivery <37 weeks  Infant Risk Factors: Prematurity <37 weeks    Recommendations/Summary  Mother is a , 36.0 weeks, NCB  on @1925. PPROM, r/o sepsis, GHTN. Low BG x1, OGG administered with DHM. Mother pumping, expressed 10mL in last session. Parents report infant sleepy and disinterested in feeding, took 5mL DHM via syringe last feeding. Following plan of pumping every 3 hours, feeding on demand, at least 8 times in 24 hours, limit latching attempts to less than 10min.  Infant due to feed. Assisted with gentle waking, infant roused easily and rooting. BG 51. Demonstrated finger feeding with syringe to parents, infant tolerated well. Gave 2mL EBM via syringe, then attempted to latch for 2min, infant sleepy. More gentle waking done, additional 3mL EBM given. Mother demonstrates good positioning in football hold. Educated on breast sandwich technique and nipple to nose. Infant latched with wide gape, flanged lips, bursts of sucking, swallowing, and rest. Mother reports latch comfortable.    Plan:  -Frequent skin to skin  -Feed on demand, at least 8 times in 24 hours, or every 3 hours  -Give \"appetizer\" of 5-10mL EBM before attempting to latch  -Attempt to latch for no more than 10 minutes. Limit total feed time to less than 30 min.   -Pump after feeding, 8 times in 24 hours. Pump for 15min on initiate setting.    Educated parents on skin to skin, hand expression, hunger cues and feeding frequency/patterns. Discussed expected output, weight loss <10%, and normal bilirubin. Educated on AAP pacifier recommendations. Reviewed outpatient resources.    Parents verbalized understanding and comfort with plan and education.    "

## 2024-01-02 NOTE — L&D DELIVERY NOTE
OB Delivery Note  2024  Luba Middleton  33 y.o.   Vaginal, Spontaneous        Gestational Age: 36w0d  /Para:   Quantitative Blood Loss: Admission to Discharge: 0 mL (2024 10:40 AM - 2024  8:42 PM)    Yue Middleton [[id number]]      Labor Events    Rupture date/time: 2024 0330  Rupture type: Spontaneous  Fluid color: Clear  Fluid odor: None  Complications: None       Labor Event Times    Dilation complete date/time: 2024       Placenta    Placenta delivery date/time: 2024  Placenta removal: Spontaneous  Placenta appearance: Intact  Placenta disposition: pathology       Cord    Vessels: 3 vessels  Complications: None  Delayed cord clamping?: Yes  Cord blood disposition: Lab  Gases sent?: No  Stem cell collection (by provider): No       Lacerations    Episiotomy: None  Perineal laceration: 1st  Labial laceration?: Yes  Labial laceration location: right  Labial laceration repaired?: Yes  Vaginal laceration?: Yes  Vaginal laceration location: bilateral  Vaginal laceration repaired?: Yes  Repair suture: 3-0 Synthetic Suture, 4-0 Synthetic Suture       Anesthesia    Method: Local  Analgesics: NITROUS OXIDE - OXYGEN THERAPY       Operative Delivery    Forceps attempted?: No  Vacuum extractor attempted?: No       Shoulder Dystocia    Shoulder dystocia present?: No        Delivery    Time head delivered: 2024 19:25:00  Birth date/time: 2024 19:25:00  Delivery type: Vaginal, Spontaneous  Complications: None       Resuscitation    Method: Tactile stimulation       Apgars    Living status:   Apgar Component Scores:  1 min.:  5 min.:  10 min.:  15 min.:  20 min.:    Skin color:  1  1       Heart rate:  2  2       Reflex irritability:  2  2       Muscle tone:  2  2       Respiratory effort:  2  2       Total:  9  9       Apgars assigned by: NOHEMI GARCIA       Delivery Providers    Delivering clinician: COLTON Perez   Provider Role    Adriana Zhou RN  Delivery Nurse    Janna Portillo, RN Nursery Nurse    Yung Greenberg MD Pediatrician             Called to room to examine patient at 1840 for urge to push. Cervical exam at that time 2.5/100/-1. Remained at bedside for labor support. Patient reported additional pressure at 1915 and was examined and found to be complete. Patient began pushing. Fetal descent noted throughout pushing efforts. Hands-on perineum at  with controlled extension of fetal head. Shoulders delivered swiftly following restitution. Postpartum pitocin bolus initiated. Vigorous infant placed on maternal abdomen for skin to skin. Cord clamped and cut by FOB, delayed cord clamping allowed for 90 seconds. Infant then moved to warmer to be evaluated by pediatrician.  Placenta delivered spontaneously and in tact with gentle traction on umbilical cord. Fundus palpated firm, midline, and at umbilicus. Vagina, perineum, and labia inspected. Vaginal, right labial, and first degree perineal laceration noted. Vaginal and perineal laceration approximated with 3-0 vicryl. Right labial laceration approximated with 4-0 vicryl.  ml. Mother and infant bonding and in stable condition.   GAYE Perez-GUSTAVOM

## 2024-01-02 NOTE — PROGRESS NOTES
Spiritual Care Visit    Clinical Encounter Type  Visited With: Patient and family together  Routine Visit: Introduction  Continue Visiting: No                                            Taxonomy  Intended Effects: Convey a calming presence, Preserve dignity and respect, Promote sense of peace, Helping someone feel comforted  Methods: Offer support  Interventions: Provide a Buddhist item(s), Share words of hope and inspiration    Patient was with her  and baby.  Patient shared this is her second child.  Patient has no specific Sabianist.  Patient shared her and her  are .   was a  supportive presence and congratulated her and gave her a baby cross and a Bible.

## 2024-01-02 NOTE — PROGRESS NOTES
Social Work Brief Note     Patient: Luba Middleton   [place] Name: Francisco J Middleton   [place] : 22       Reason for Visit: Support       History: Mrs. Middleton presented to [facility] for delivery of her second child and delivered premature baby boy at 36 weeks gestation.       Assessment:  was able to review chart and meet with Mrs. Middleton and her spouse/FOB, Sekou Trejo, to introduce self and provide support and assistance as may be needed at this time. Mrs. Middleton stated to be feeling well and they spoke of , Francisco J, who is also doing well to this point, and of their 2 1/2 year old son, Mert. Mrs. Middleton with no history of mental health, but  provided information about PPD and paternal post- depression and resources if needed. No questions or needs at this time, but were encouraged to have  contacted should anything arise. Support provided and self-care encouraged.       Plan:   will remain available if/as needed through remainder of admission.       Signature:  GREGORIO Ibrahim

## 2024-01-02 NOTE — PROGRESS NOTES
WBC 27.6  Pulse 110 --> Will monitor for 10+ min  Noted afebrile 36.9    Stat sepsis labs ordered: CMP, lactate, Coags  Dr. Mcwilliams notified of current status   Will reassess with labs and continue to monitor

## 2024-01-02 NOTE — PROGRESS NOTES
Briefly, 34 y/o  who is PPD#1 from  at 36wga after prolonged ROM now meeting sepsis criteria by HR > 110 and WBC 27.6. Patient seen and examined. Overall feeling well, some perineal soreness and mild cramping. Burning with urination associated with laceration repair. No back pain or respiratory symptoms. Last , /85, temp 36.8, RR 18, SpO2 96%. Awake, alert, well-appearing, breast feeding infant. Fundus firm below umbilicus, non-tender. Lungs CTA, tachycardic with regular rhythm, no CVA tenderness. Sepsis labs already ordered by CMM and notable only for lactate 2.8. UA and cultures pending. No localizing symptoms at this time though patient does have risk factor for endometritis. Vanc/zosyn and IVF bolus started. To repeat labs in 6 hours and if lactate normalized, would consider discontinuing antibiotics at that time.     Pooja Mcwilliams MD

## 2024-01-02 NOTE — PROGRESS NOTES
Postpartum Progress Note    Assessment/Plan   Luba Middleton is a 33 y.o., , who delivered at 36w0d gestation and is now postpartum day 1.    Active Problems:    , delivered     premature rupture of membranes (PPROM) with unknown onset of labor    Gestational hypertension, third trimester     +Sepsis workup --> WBC 27.6/Pulse >110/Lactate 2.8    A:  PPD1      Hypertensive disorders of pregnancy      Positive sepsis workup        P: Reviewed labs, vitals      Sepsis antibiotics ordered per guidelines      Labs/cultures pending      Counseled patient on sepsis workup and guidelines. Discussed labs and need for IV antibiotics. Questions answered, patient verbalizes understanding and agrees with this POC       Will continue to monitor patient status     Pregnancy Problems (from 23 to present)       Problem Noted Resolved     premature rupture of membranes (PPROM) with unknown onset of labor 2024 by COLTON Perez No    Priority:  Medium      Gestational hypertension, third trimester 2024 by COLTON Perez No    Priority:  Medium      SARS-CoV-2 positive 2023 by COLTON Szymanski No    Priority:  Medium      Overview Signed 2023  3:02 PM by COLTON Szymanski     Positive home test on 12/3/23         , delivered 2023 by Fadumo Rivas MD No    Priority:  Medium      Overview Signed 2023  1:12 PM by Fadumo Rivas MD     - done for breech   - planning TOLAC  - TOLAC consent signed    - MFMU 80%         Antepartum multigravida of advanced maternal age 2023 by Fadumo Rivas MD No    Priority:  Medium      Overview Signed 2023  1:13 PM by Fadumo Rivas MD     - risk-reducing NIPS          Supervision of other normal pregnancy, antepartum 2023 by Fadumo Rivas MD 2024 by COLTON Pete    Priority:  Medium      Overview Addendum 2023  9:17 AM by  COLTON Szymanski     - Ashkenazi Buddhism - reports negative carrier screening in first pregnancy   - s/p flu vaccine  - s/p Tdap vaccine  - Updated COVID vaccine recommended  - recommend RSV vaccine between 32-36w  - Birth plan: TOLAC, prefers not to be induced, NCB, Nitrous if needed. Pt will likely choose a repeat  if IOL is needed for hypertension          Asthma affecting pregnancy in third trimester 2023 by Fadumo Rivas MD 2024 by COLTON Pete    Priority:  Medium      History of gestational hypertension 10/5/2023 by Tarah Arnett 2024 by COLTON Pete    Priority:  Medium      Overview Addendum 2023  9:18 AM by COLTON Szymanski     - on prophylactic ASA   - 12/15 BP elevation at 33 weeks, NST, PET labs WNL  - close interval visit  for b/p check               Hospital course:  Elevated BP ---> Criteria not met inpatient  Vaginal Birth  Patient is currently breastfeedingThe patient's blood type is O POS. The baby's blood type is O POS . Rhogam is not indicated.    Subjective   Her pain is well controlled with current medications  She is passing flatus  She is ambulating well  She is tolerating a Adult diet Regular  She reports poor infant latch  She denies emotional concerns today   Her plan for contraception is none     Denies s/s pre-e  Reports she feels good    Objective   Allergies:   Patient has no known allergies.         Last Vitals:  Temp Pulse Resp BP MAP Pulse Ox   36.9 °C (98.4 °F) (!) 120 17 116/85   98 %     Vitals Min/Max Last 24 Hours:  Temp  Min: 36.4 °C (97.6 °F)  Max: 37.2 °C (99 °F)  Pulse  Min: 69  Max: 120  Resp  Min: 16  Max: 18  BP  Min: 98/54  Max: 143/78    Intake/Output:     Intake/Output Summary (Last 24 hours) at 2024 1027  Last data filed at 2024 0430  Gross per 24 hour   Intake --   Output 1550 ml   Net -1550 ml       Physical Exam:  General: Examination reveals a well developed, well  nourished, female, in no acute distress. She is alert and cooperative.  HEENT: PERRLA. External ears normal. Nose normal, no erythema or discharge. Mouth and throat clear.  Fundus: firm and below umbilicus.  Perineum: well approximated.  Neurological: alert, oriented, normal speech, no focal findings or movement disorder noted, DTRs normal and symmetrical.    Lab Data:  Lab Results   Component Value Date    WBC 27.6 (H) 01/02/2024    HGB 9.6 (L) 01/02/2024    HCT 30.3 (L) 01/02/2024     01/02/2024     Lab Results   Component Value Date    GLUCOSE 173 (H) 01/02/2024     (L) 01/02/2024    K 3.6 01/02/2024     01/02/2024    CO2 19 (L) 01/02/2024    ANIONGAP 14 01/02/2024    BUN 9 01/02/2024    CREATININE 0.75 01/02/2024    EGFR >90 01/02/2024    CALCIUM 8.1 (L) 01/02/2024    ALBUMIN 2.8 (L) 01/02/2024    PROT 5.6 (L) 01/02/2024    ALKPHOS 85 01/02/2024    ALT 8 01/02/2024    AST 19 01/02/2024    BILITOT 0.6 01/02/2024     Lab Results   Component Value Date    APTT 26 (L) 01/02/2024    PROTIME 11.7 01/02/2024    INR 1.0 01/02/2024     Lab Results   Component Value Date    LACTATE 2.8 (H) 01/02/2024     Blood/urine cultures pending

## 2024-01-02 NOTE — PROGRESS NOTES
Evening lab results reviewed:    AST/ALT: WNL  Lactate: Improved from 2.8 to 0.9  WBC: 27.6 to 21.5  Creatinine: 0.75 to 1.00    H/H 9.6/30.3 down to 7.6/23.5 --> patient asymptomatic     Plan:  Reviewed/discussed status and labs with Dr. Mcwilliams  Antibiotics discontinued  Repeat labs tomorrow morning: CBC, BMP, Type & Screen  Venofer

## 2024-01-02 NOTE — PROGRESS NOTES
Lactate noted: 2.8  Pulse sustained >110 for >10 minutes  Coags pending  Cultures ordered per guideline  Antibiotics ordered  Dr. Mcwilliams notified and updated of current status, labs, pending orders

## 2024-01-02 NOTE — CARE PLAN
The patient's goals for the shift include bond with baby    The clinical goals for the shift include return to pre-pregnancy state      Problem: Pain - Adult  Goal: Verbalizes/displays adequate comfort level or baseline comfort level  Outcome: Progressing  Flowsheets (Taken 2024)  Verbalizes/displays adequate comfort level or baseline comfort level: Encourage patient to monitor pain and request assistance     Problem: Postpartum  Goal: Experiences normal postpartum course  Outcome: Progressing  Flowsheets (Taken 2024)  Experiences normal postpartum course:   Monitor maternal vital signs   Fundal and lochia asssessments w/fundal massage, bladder emptying  Goal: Appropriate maternal -  bonding  Outcome: Progressing  Flowsheets (Taken 2024)  Appropriate maternal- bonding:   Identify family support   24-hour rooming in  Goal: Establish and maintain infant feeding pattern for adequate nutrition  Outcome: Progressing  Flowsheets (Taken 2024)  Establish and maintain infant feeding pattern for adequate nutrition:   Assess  human milk feeding   Encourage feeding and/or pumping at least 8x/day   Refer to lactation consultant as needed  Goal: No s/sx infection  Outcome: Progressing  Flowsheets (Taken 2024)  No s/sx of infection:   Hygiene practices/nia-care   Monitor QBL and vital signs   Fundal + lochia assessments w/fundal massage, bladder emptying, intrauterine device when indicated  Goal: No s/sx of hemorrhage  Outcome: Progressing  Flowsheets (Taken 2024)  No s/sx of hemorrhage:   Monitor QBL and vital signs   Fundal + lochia assessments w/fundal massage, bladder emptying, intrauterine device when indicated  Goal: Minimal s/sx of HDP and BP<160/110  Outcome: Progressing  Flowsheets (Taken 2024)  Minimal s/sx of HDP and BP <160/110: Monitor QBL and vital signs     Problem: Safety - Adult  Goal: Free from fall injury  Outcome:  Progressing  Flowsheets (Taken 2024)  Free from fall injury: Instruct family/caregiver on patient safety     Problem: Discharge Planning  Goal: Discharge to home or other facility with appropriate resources  Outcome: Progressing  Flowsheets (Taken 2024)  Discharge to home or other facility with appropriate resources: Identify barriers to discharge with patient and caregiver     Problem: Vaginal Birth or  Section  Goal: Fetal and maternal status remain reassuring during the birth process  Outcome: Met  Goal: Prevention of malpresentation/labor dystocia through delivery  Outcome: Met  Goal: Demonstrates labor coping techniques through delivery  Outcome: Met  Goal: Minimal s/sx of HDP and BP<160/110  Recent Flowsheet Documentation  Taken 2024 by Adriana Zhou RN  Minimal s/sx of HDP and BP <160/110: Monitor QBL and vital signs  2024 by Adriana Zhou RN  Outcome: Met  Goal: No s/sx of infection through recovery  Outcome: Met  Goal: No s/sx of hemorrhage through recovery  Outcome: Met

## 2024-01-03 VITALS
WEIGHT: 159.94 LBS | TEMPERATURE: 97.9 F | HEIGHT: 63 IN | RESPIRATION RATE: 16 BRPM | SYSTOLIC BLOOD PRESSURE: 118 MMHG | DIASTOLIC BLOOD PRESSURE: 80 MMHG | HEART RATE: 90 BPM | BODY MASS INDEX: 28.34 KG/M2 | OXYGEN SATURATION: 99 %

## 2024-01-03 PROBLEM — K59.03 DRUG-INDUCED CONSTIPATION: Status: ACTIVE | Noted: 2024-01-03

## 2024-01-03 PROBLEM — O42.919 PRETERM PREMATURE RUPTURE OF MEMBRANES (PPROM) WITH UNKNOWN ONSET OF LABOR (HHS-HCC): Status: RESOLVED | Noted: 2024-01-01 | Resolved: 2024-01-03

## 2024-01-03 LAB
ABO GROUP (TYPE) IN BLOOD: NORMAL
ANION GAP SERPL CALC-SCNC: 11 MMOL/L (ref 10–20)
ANTIBODY SCREEN: NORMAL
BACTERIA UR CULT: NO GROWTH
BLOOD EXPIRATION DATE: NORMAL
BUN SERPL-MCNC: 11 MG/DL (ref 6–23)
CALCIUM SERPL-MCNC: 7.7 MG/DL (ref 8.6–10.3)
CHLORIDE SERPL-SCNC: 108 MMOL/L (ref 98–107)
CO2 SERPL-SCNC: 23 MMOL/L (ref 21–32)
CREAT SERPL-MCNC: 0.75 MG/DL (ref 0.5–1.05)
DISPENSE STATUS: NORMAL
ERYTHROCYTE [DISTWIDTH] IN BLOOD BY AUTOMATED COUNT: 13.5 % (ref 11.5–14.5)
GFR SERPL CREATININE-BSD FRML MDRD: >90 ML/MIN/1.73M*2
GLUCOSE SERPL-MCNC: 70 MG/DL (ref 74–99)
GP B STREP GENITAL QL CULT: ABNORMAL
HCT VFR BLD AUTO: 22.2 % (ref 36–46)
HGB BLD-MCNC: 7.1 G/DL (ref 12–16)
HOLD SPECIMEN: NORMAL
MCH RBC QN AUTO: 27.5 PG (ref 26–34)
MCHC RBC AUTO-ENTMCNC: 32 G/DL (ref 32–36)
MCV RBC AUTO: 86 FL (ref 80–100)
NRBC BLD-RTO: 0 /100 WBCS (ref 0–0)
PLATELET # BLD AUTO: 162 X10*3/UL (ref 150–450)
POTASSIUM SERPL-SCNC: 3.8 MMOL/L (ref 3.5–5.3)
PRODUCT BLOOD TYPE: 5100
PRODUCT CODE: NORMAL
RBC # BLD AUTO: 2.58 X10*6/UL (ref 4–5.2)
RH FACTOR (ANTIGEN D): NORMAL
SODIUM SERPL-SCNC: 138 MMOL/L (ref 136–145)
UNIT ABO: NORMAL
UNIT NUMBER: NORMAL
UNIT RH: NORMAL
UNIT VOLUME: 350
WBC # BLD AUTO: 18.2 X10*3/UL (ref 4.4–11.3)
XM INTEP: NORMAL

## 2024-01-03 PROCEDURE — 2500000004 HC RX 250 GENERAL PHARMACY W/ HCPCS (ALT 636 FOR OP/ED): Performed by: MIDWIFE

## 2024-01-03 PROCEDURE — 80048 BASIC METABOLIC PNL TOTAL CA: CPT | Performed by: MIDWIFE

## 2024-01-03 PROCEDURE — 36415 COLL VENOUS BLD VENIPUNCTURE: CPT | Performed by: MIDWIFE

## 2024-01-03 PROCEDURE — 85027 COMPLETE CBC AUTOMATED: CPT | Performed by: MIDWIFE

## 2024-01-03 PROCEDURE — 2500000001 HC RX 250 WO HCPCS SELF ADMINISTERED DRUGS (ALT 637 FOR MEDICARE OP)

## 2024-01-03 PROCEDURE — 86901 BLOOD TYPING SEROLOGIC RH(D): CPT | Performed by: MIDWIFE

## 2024-01-03 RX ORDER — DOCUSATE SODIUM 100 MG/1
100 CAPSULE, LIQUID FILLED ORAL 2 TIMES DAILY PRN
Qty: 30 CAPSULE | Refills: 5 | Status: SHIPPED | OUTPATIENT
Start: 2024-01-03

## 2024-01-03 RX ORDER — FERROUS SULFATE 325(65) MG
325 TABLET, DELAYED RELEASE (ENTERIC COATED) ORAL
Qty: 90 TABLET | Refills: 0 | Status: SHIPPED | OUTPATIENT
Start: 2024-01-03 | End: 2024-04-02

## 2024-01-03 RX ADMIN — IBUPROFEN 600 MG: 600 TABLET, FILM COATED ORAL at 04:44

## 2024-01-03 RX ADMIN — ACETAMINOPHEN 975 MG: 325 TABLET ORAL at 04:44

## 2024-01-03 RX ADMIN — IBUPROFEN 600 MG: 600 TABLET, FILM COATED ORAL at 17:00

## 2024-01-03 RX ADMIN — ACETAMINOPHEN 975 MG: 325 TABLET ORAL at 17:00

## 2024-01-03 RX ADMIN — ACETAMINOPHEN 975 MG: 325 TABLET ORAL at 10:01

## 2024-01-03 RX ADMIN — IRON SUCROSE 300 MG: 20 INJECTION, SOLUTION INTRAVENOUS at 10:17

## 2024-01-03 RX ADMIN — IBUPROFEN 600 MG: 600 TABLET, FILM COATED ORAL at 10:01

## 2024-01-03 ASSESSMENT — PAIN SCALES - GENERAL
PAINLEVEL_OUTOF10: 0 - NO PAIN

## 2024-01-03 NOTE — PROGRESS NOTES
"Postpartum Progress Note    Subjective    Pt doing well. Pain is well controlled with meds. Afebrile. No chills.  Scant/normal lochia. Voiding. Up and walking. Endorses mild fatigue.  Eating regular diet. No N/V. Passing gas. No BM yet.  Breastfeeding without difficulty.  No Complaints.    Objective    /80   Pulse 90   Temp 36.6 °C (97.9 °F) (Oral)   Resp 16   Ht 1.6 m (5' 2.99\")   Wt 72.6 kg (159 lb 15.1 oz)   LMP 2023   SpO2 99%   Breastfeeding Yes   BMI 28.34 kg/m²      General: Examination reveals a well developed, well nourished, female, in no acute distress. She is alert and cooperative.  HEENT: normocephalic, atraumatic, conjunctiva clear  Lungs: unlabored respirations  Breasts: lactating, no erythema or tenderness, nipples normal.  Abdomen: Soft, non-distended  Fundus: firm, midline, and -2 below umbilicus.  Perineum: well approximated.  Extremities: no redness or tenderness in the calves or thighs, no edema.  Psychological: awake and alert; oriented to person, place, and time.    Lab Results   Component Value Date    HGB 7.1 (L) 2024    HCT 22.2 (L) 2024       mL     Assessment/Plan    33 y.o. postpartum day 2 s/p   Active Problems:    , delivered     premature rupture of membranes (PPROM) with unknown onset of labor    Gestational hypertension, third trimester      Plan:    1. Postpartum care  - doing well, no complaints at this time.  - mood stable  - continue routine PP care.  - pain well controlled with PO medications.  - dvt risk score 3, not a candidate for lovenox ppx  - blood type O positive; Rhogam not indicated    2. Anemia  - Hgb 10.9 on admit > 9.6 PPD#1 AM > 7.6 PPD#1 PM > 7.1 PPD#2 PM  - delivery QBL 850cc  - pt asymptomatic  - previously declined IV Venofer, now accepting of one dose today prior to discharge  - PO iron sent to pt home pharmacy for discharge    3. Concern for Sepsis  - PPD#1 Sepsis criteria met secondary to HR >110 and WBC " 27.6  - s/p IV Vanc, Zosyn, and IV fluid bolus yesterday, antibiotics now discontinued  - all labs improved; Lactate 2.8 to 0.9; WBC: 27.6 to 21.5 to 18.2  - most recent VS all WNL; temp 36.6, pulse 90, resp 16, /80, SpO2 99%    4. Gestational Hypertension  - all BP's reviewed during inpatient stay and notable for x2 mild-ranges, otherwise normotensive  - Discussed recommended 3 day postpartum stay; pt. voiced understanding, however requests discharge today with close monitoring and follow-up.  - Discussed discharge to home with BP cuff, BID BP monitoring, correct method of obtaining BP, values which would require re-check, contacting CNM on-call, and/or returning to the hospital, PEC warning signs, and virtual follow up visit within 1 week. Pt. voiced understanding of all.     5. Contraception  - to be discussed at 6wk postpartum visit, may call office for earlier contraceptive management if desired  - abstinence strongly encouraged until cessation of lochia    6. Feeding Baby  - breastfeeding baby, encouraged to continue BF.   - reviewed s/s of mastitis  - consult lactation PRN.    7. Pregnancy notable for  - prior  section; MFMU 80%  - ADHD; takes Adderall  - AMA; s/p rr NIPT    8. Dispo  - Anticipating discharge today.  - Discharge education provided, pt verbalizes understanding and all questions answered  - Discussed PRN medications for discharge, pt opts to purchase OTC if needed  - F/U visit within one week for BP check, then in 6 weeks with primary OB provider.    GAYE Szymanski-GUSTAVOM

## 2024-01-03 NOTE — NURSING NOTE
Written and verbal discharge instructions given ,post tuan warning signs reviewed, pt and  verbalizes understanding of all instructions and impoprtance of follow up care with self and peds for baby

## 2024-01-03 NOTE — CARE PLAN
The patient's goals for the shift include breastfeed on demand    The clinical goals for the shift include get some rest      Problem: Pain - Adult  Goal: Verbalizes/displays adequate comfort level or baseline comfort level  Outcome: Progressing  Flowsheets (Taken 2024 by Adriana Zhou RN)  Verbalizes/displays adequate comfort level or baseline comfort level: Encourage patient to monitor pain and request assistance     Problem: Postpartum  Goal: Experiences normal postpartum course  Outcome: Progressing  Flowsheets (Taken 2024 by Adriana Zhou RN)  Experiences normal postpartum course:   Monitor maternal vital signs   Fundal and lochia asssessments w/fundal massage, bladder emptying  Goal: Appropriate maternal -  bonding  Outcome: Progressing  Flowsheets (Taken 2024 by Adriana Zhou RN)  Appropriate maternal- bonding:   Identify family support   24-hour rooming in  Goal: Establish and maintain infant feeding pattern for adequate nutrition  Outcome: Progressing  Flowsheets (Taken 2024 by Adriana Zhou RN)  Establish and maintain infant feeding pattern for adequate nutrition:   Assess  human milk feeding   Encourage feeding and/or pumping at least 8x/day   Refer to lactation consultant as needed  Goal: No s/sx infection  Outcome: Met  Goal: No s/sx of hemorrhage  Outcome: Met  Goal: Minimal s/sx of HDP and BP<160/110  Outcome: Met     Problem: Safety - Adult  Goal: Free from fall injury  Outcome: Progressing  Flowsheets (Taken 2024 by Adriana Zhou RN)  Free from fall injury: Instruct family/caregiver on patient safety     Problem: Discharge Planning  Goal: Discharge to home or other facility with appropriate resources  Outcome: Progressing  Flowsheets (Taken 2024 by Adriana Zhou RN)  Discharge to home or other facility with appropriate resources: Identify barriers to discharge with patient and caregiver

## 2024-01-03 NOTE — DISCHARGE SUMMARY
Discharge Summary    Admission Date: 2024  Discharge Date: 1/3/2024    Discharge Diagnosis  , delivered  Anemia    Hospital Course  Delivery Date: 2024  7:25 PM   Delivery type: Vaginal, Spontaneous    GA at delivery: 36w0d  Outcome: Living   Anesthesia during delivery: Local   Intrapartum complications: None   Feeding method: Breastfeeding Status: Yes     Procedures:  spontaneous vaginal delivery, electronic fetal monitoring, repair of perineal laceraton  Contraception at discharge: to be discussed at 6 week post-partum visit    Luba Middleton is a 33 y.o.  at 36w0d. Estimated Date of Delivery: 24  PPROM 24 @ 0330, clear fluid  TOLAC  Initial h/h 10.9/33.7    Betamethasone 1240, PCN started 1240    1812- /91- mild pressure noted antepartum this is her second elevation, criteria met for gHTN    Labor course  24  1130- visually closed and long, + fern, clear non- odorous fluid  1600- pitocin started  1840- 2-3/100/-1  1915- 10/100/+2  NSVB @ 1925, male. Vaginal, right labial, and first degree lac. - CBC ordered for AM    24 PPD#1  AM CBC 9.6/30.3  1030- Sepsis criteria met secondary to HR >110 and WBC 27.6; labs drawn and plan to repeat in 6 hours  PE largely normal without localizing symptoms of infection, per Dr. Mcwilliams, however IV Vanc/Zosyn started d/t risk factors for endometritis.     1641- AST/ALT: WNL  Lactate: Improved from 2.8 to 0.9  WBC: 27.6 to 21.5  Creatinine: 0.75 to 1.00  H/H 9.6/30.3 down to 7.6/23.5 --> patient asymptomatic  Pt offered IV Venofer, she declined  Antibiotics discontinued    1/3/24 PPD#2  0529 H/H 7.1/22.2  WBC 18.2  Recommended IV Venofer again, she accepts x1 dose prior to discharge    Pertinent Physical Exam At Time of Discharge  See daily progress note    Discharge Meds     Your medication list        START taking these medications        Instructions Last Dose Given Next Dose Due   docusate sodium 100 mg capsule  Commonly known as:  Colace      Take 1 capsule (100 mg) by mouth 2 times a day as needed for constipation.       ferrous sulfate 325 (65 Fe) MG EC tablet      Take 1 tablet by mouth once daily with breakfast. Do not crush, chew, or split. Take with Colace to prevent constipation.              CONTINUE taking these medications        Instructions Last Dose Given Next Dose Due   amphetamine-dextroamphetamine 30 mg tablet  Commonly known as: Adderall           ENFAMIL DHA-SUSIE SUPPLEMENT ORAL                  STOP taking these medications      aspirin 81 mg EC tablet        Prenatal  mg-mcg tablet  Generic drug: PNV133-ferrous fumarate-FA                  Where to Get Your Medications        These medications were sent to [facility]/pharmacy #[id number] - [place], [place] - [address] AT COR[address] OF [address], [facility] [address]      Phone: [phone]   docusate sodium 100 mg capsule  ferrous sulfate 325 (65 Fe) MG EC tablet          Complications Requiring Follow-Up  Gestational Hypertension > follow-up within one week for virtual visit / BP check with provider    Test Results Pending At Discharge  Pending Labs       Order Current Status    Extra Urine Gray Tube Collected (01/02/24 1317)    Surgical Pathology Exam - PLACENTA In process    Urinalysis with Reflex Culture and Microscopic In process    Urine Culture In process    Blood Culture Preliminary result    Blood Culture Preliminary result    Group B Streptococcus (GBS) Prenatal Screen, Culture Preliminary result            Outpatient Follow-Up  Future Appointments   Date Time Provider Department Center   1/4/2024  4:20 PM COLTON Szymanski [id number] Elia DE PAZ spent 30 minutes in the professional and overall care of this patient.      COLTON Szymanski

## 2024-01-04 ENCOUNTER — APPOINTMENT (OUTPATIENT)
Dept: OBSTETRICS AND GYNECOLOGY | Facility: CLINIC | Age: 34
End: 2024-01-04
Payer: COMMERCIAL

## 2024-01-05 LAB
LABORATORY COMMENT REPORT: NORMAL
PATH REPORT.FINAL DX SPEC: NORMAL
PATH REPORT.GROSS SPEC: NORMAL
PATH REPORT.RELEVANT HX SPEC: NORMAL
PATH REPORT.TOTAL CANCER: NORMAL

## 2024-01-06 LAB
BACTERIA BLD CULT: NORMAL
BACTERIA BLD CULT: NORMAL

## 2024-01-10 ENCOUNTER — TELEMEDICINE (OUTPATIENT)
Dept: OBSTETRICS AND GYNECOLOGY | Facility: CLINIC | Age: 34
End: 2024-01-10
Payer: COMMERCIAL

## 2024-01-10 ENCOUNTER — APPOINTMENT (OUTPATIENT)
Dept: OBSTETRICS AND GYNECOLOGY | Facility: CLINIC | Age: 34
End: 2024-01-10
Payer: COMMERCIAL

## 2024-01-10 VITALS — SYSTOLIC BLOOD PRESSURE: 125 MMHG | DIASTOLIC BLOOD PRESSURE: 97 MMHG

## 2024-01-10 DIAGNOSIS — D62 ACUTE BLOOD LOSS ANEMIA: ICD-10-CM

## 2024-01-10 DIAGNOSIS — O13.9 GESTATIONAL HYPERTENSION, ANTEPARTUM (HHS-HCC): Primary | ICD-10-CM

## 2024-01-10 PROCEDURE — 99213 OFFICE O/P EST LOW 20 MIN: CPT

## 2024-01-10 ASSESSMENT — EDINBURGH POSTNATAL DEPRESSION SCALE (EPDS)
THE THOUGHT OF HARMING MYSELF HAS OCCURRED TO ME: NEVER
I HAVE BEEN ABLE TO LAUGH AND SEE THE FUNNY SIDE OF THINGS: AS MUCH AS I ALWAYS COULD
I HAVE BEEN SO UNHAPPY THAT I HAVE HAD DIFFICULTY SLEEPING: NOT AT ALL
I HAVE BEEN ANXIOUS OR WORRIED FOR NO GOOD REASON: NO, NOT AT ALL
I HAVE BEEN SO UNHAPPY THAT I HAVE BEEN CRYING: NO, NEVER
I HAVE FELT SCARED OR PANICKY FOR NO GOOD REASON: NO, NOT MUCH
TOTAL SCORE: 1
I HAVE BLAMED MYSELF UNNECESSARILY WHEN THINGS WENT WRONG: NO, NEVER
THINGS HAVE BEEN GETTING ON TOP OF ME: NO, I HAVE BEEN COPING AS WELL AS EVER
I HAVE LOOKED FORWARD WITH ENJOYMENT TO THINGS: AS MUCH AS I EVER DID
I HAVE FELT SAD OR MISERABLE: NO, NOT AT ALL

## 2024-01-10 ASSESSMENT — ENCOUNTER SYMPTOMS
SWEATS: 0
HYPERTENSION: 1
PND: 0
HEADACHES: 0
ORTHOPNEA: 0
NECK PAIN: 0
SHORTNESS OF BREATH: 0
BLURRED VISION: 0
PALPITATIONS: 0

## 2024-01-10 NOTE — PROGRESS NOTES
Virtual or Telephone Consent    An interactive audio and video telecommunication system which permits real time communications between the patient (at the originating site) and provider (at the distant site) was utilized to provide this telehealth service.   Verbal consent was requested and obtained from Luba Middleton on this date, 01/10/24 for a telehealth visit.       Subjective   33 y.o.  presenting for postpartum follow-up for Blood pressure.  Delivery Date: 2024  GA: 36 weeks  Type of Delivery: Vaginal, Spontaneous    Intrapartum complications: gHTN (not on meds), Acute blood loss anemia, and sepsis evaluation. Not on meds and normal labs  Pt has been taking BP reading twice daily.  , 126/86  , 125/88   125/91  , 124/98   - PM: 125/96, PM: 122/94   - 1pm: 124/98  Patient denies any HA, visual disturbances, increased swelling, upper belly pain, heartburn, SOB, or chest pain.    Pregnancy Problems (from 23 to present)       Problem Noted Resolved    Gestational hypertension, third trimester 2024 by COLTON Perez No    Priority:  Medium      SARS-CoV-2 positive 2023 by COLTON Szymanski No    Priority:  Medium      Overview Signed 2023  3:02 PM by COLTON Szymanski     Positive home test on 12/3/23         , delivered 2023 by Fadumo Rivas MD No    Priority:  Medium      Overview Signed 2023  1:12 PM by Fadumo Rivas MD     - done for breech   - planning TOLAC  - TOLAC consent signed    - MFMU 80%         Antepartum multigravida of advanced maternal age 2023 by Fadumo Rivas MD No    Priority:  Medium      Overview Signed 2023  1:13 PM by Fadumo Rivas MD     - risk-reducing NIPS           premature rupture of membranes (PPROM) with unknown onset of labor 2024 by COTLON Perez 1/3/2024 by COLTON Szymanski    Supervision of other normal  pregnancy, antepartum 2023 by Fadumo Rivas MD 2024 by COLTON Pete    Overview Addendum 2023  9:17 AM by COLTON Szymanski     - Ashkenazi Yazidi - reports negative carrier screening in first pregnancy   - s/p flu vaccine  - s/p Tdap vaccine  - Updated COVID vaccine recommended  - recommend RSV vaccine between 32-36w  - Birth plan: TOLAC, prefers not to be induced, NCB, Nitrous if needed. Pt will likely choose a repeat  if IOL is needed for hypertension          Asthma affecting pregnancy in third trimester 2023 by Fadumo Rivas MD 2024 by COLTON Pete    History of gestational hypertension 10/5/2023 by Tarah Arnett 2024 by COLTON Pete    Overview Addendum 2023  9:18 AM by COLTON Szymanski     - on prophylactic ASA   - 12/15 BP elevation at 33 weeks, NST, PET labs WNL  - close interval visit  for b/p check                   PP Recovery:   Pain: controlled  Lacerations: 1st degree, vaginal, and pain improving  Perineal discomfort: improving  Lochia: decreasing  Feeding Method: She is breast feeding exclusively. no breast or nursing problems  Lactation Consult Needed?: No  Birth Trauma: No  Bonding with Baby: well with baby boy,   Depression - Denies s/s depression.    Emotional Support - SO  Bowel Symptoms - Negative for abdominal discomfort, blood in stool or black stool, and negative change in bowel habits.  Bladder Symptoms - No dysuria, denies hematuria, urinary frequency, urinary urgency or incontinence.   Signs or symptoms of infection: patient denies any vaginal odor or discharge, worsening abdominal pain, or fever  Physical Exam  Constitutional:       Appearance: Normal appearance.   Eyes:      Conjunctiva/sclera: Conjunctivae normal.   Pulmonary:      Effort: Pulmonary effort is normal.   Neurological:      Mental Status: She is alert.   Psychiatric:         Mood and Affect: Mood normal.           Plan      A/P. 33 y.o. now , s/p , intrapartum course complicated by gHTN, acute blood loss anemia, and sepsis evaluation  Anemia- patient reports fatigue at times, though overall is feeling well and denies any lightheadedness or dizziness. Patient to continue iron as directed  gHTN: reviewed blood pressure log with Dr. Cervantes. As patient is not having any symptoms and blood pressures are only mildly elevated, patient should continue to take BP twice daily and to call for any BP's that exceed parameters: Bps >/= to 150 systolic and or >/= 100 diastolic x2 or any BP elevations of >/= 160 systolic and/or >/= 110 diastolic . Once patient is consistently having normal bp readings <140 systolic and <90 diastolic x 1 week patient may discontinue BP measurements. If patient at anytime becomes symptomatic: having a HA unrelieved by tylenol, visual disturbances, RUQ pain, chest pain, or increase in swelling patient should take BP.   As long as Bps are improving and patient remains stable she should follow-up next for her 6 week pp exam.   Warning s/s discussed  All questions answered    F/U for 6 week postpartum visit and PRN

## 2024-01-23 ENCOUNTER — POSTPARTUM VISIT (OUTPATIENT)
Dept: OBSTETRICS AND GYNECOLOGY | Facility: CLINIC | Age: 34
End: 2024-01-23
Payer: COMMERCIAL

## 2024-01-23 VITALS
BODY MASS INDEX: 25.02 KG/M2 | HEIGHT: 63 IN | SYSTOLIC BLOOD PRESSURE: 124 MMHG | DIASTOLIC BLOOD PRESSURE: 70 MMHG | WEIGHT: 141.2 LBS

## 2024-01-23 DIAGNOSIS — N89.8 VAGINAL ODOR: ICD-10-CM

## 2024-01-23 DIAGNOSIS — R10.2 VAGINAL PAIN: Primary | ICD-10-CM

## 2024-01-23 DIAGNOSIS — B00.9 HSV INFECTION: Primary | ICD-10-CM

## 2024-01-23 PROBLEM — O34.219 VBAC, DELIVERED (HHS-HCC): Status: RESOLVED | Noted: 2023-11-08 | Resolved: 2024-01-23

## 2024-01-23 PROCEDURE — 99213 OFFICE O/P EST LOW 20 MIN: CPT

## 2024-01-23 RX ORDER — VALACYCLOVIR HYDROCHLORIDE 500 MG/1
500 TABLET, FILM COATED ORAL 2 TIMES DAILY
Qty: 6 TABLET | Refills: 5 | Status: SHIPPED | OUTPATIENT
Start: 2024-01-23 | End: 2024-01-26

## 2024-01-23 RX ORDER — IBUPROFEN 600 MG/1
600 TABLET ORAL EVERY 6 HOURS
COMMUNITY
Start: 2024-01-01 | End: 2024-03-01 | Stop reason: WASHOUT

## 2024-01-23 ASSESSMENT — LIFESTYLE VARIABLES
HOW MANY STANDARD DRINKS CONTAINING ALCOHOL DO YOU HAVE ON A TYPICAL DAY: PATIENT DOES NOT DRINK
HOW OFTEN DO YOU HAVE A DRINK CONTAINING ALCOHOL: NEVER
AUDIT-C TOTAL SCORE: 0
HOW OFTEN DO YOU HAVE SIX OR MORE DRINKS ON ONE OCCASION: NEVER
SKIP TO QUESTIONS 9-10: 1

## 2024-01-23 ASSESSMENT — EDINBURGH POSTNATAL DEPRESSION SCALE (EPDS)
TOTAL SCORE: 3
I HAVE BEEN SO UNHAPPY THAT I HAVE BEEN CRYING: NO, NEVER
I HAVE BEEN ANXIOUS OR WORRIED FOR NO GOOD REASON: HARDLY EVER
I HAVE FELT SAD OR MISERABLE: NO, NOT AT ALL
I HAVE FELT SCARED OR PANICKY FOR NO GOOD REASON: NO, NOT MUCH
I HAVE BEEN ABLE TO LAUGH AND SEE THE FUNNY SIDE OF THINGS: AS MUCH AS I ALWAYS COULD
THE THOUGHT OF HARMING MYSELF HAS OCCURRED TO ME: NEVER
THINGS HAVE BEEN GETTING ON TOP OF ME: NO, I HAVE BEEN COPING AS WELL AS EVER
I HAVE BLAMED MYSELF UNNECESSARILY WHEN THINGS WENT WRONG: NOT VERY OFTEN
I HAVE LOOKED FORWARD WITH ENJOYMENT TO THINGS: AS MUCH AS I EVER DID
I HAVE BEEN SO UNHAPPY THAT I HAVE HAD DIFFICULTY SLEEPING: NOT AT ALL

## 2024-01-23 NOTE — PROGRESS NOTES
Subjective   33 y.o.  presenting for postpartum follow-up 3 weeks postpartum for concerns of vaginal pain and odor. Patient reports vaginal burning and irritation that started on  as well as  vaginal odor that has been present for the last 1-2 weeks. Patient denies any fever or abdominal pain. Patient states that discomfort was worse when she called and reported symptoms last week, and that discomfort has been steadily improving.     Patient EPDS 3, scant lochia.     Physical Exam  Constitutional:       Appearance: Normal appearance.   Eyes:      Conjunctiva/sclera: Conjunctivae normal.   Pulmonary:      Effort: Pulmonary effort is normal.   Genitourinary:     Comments: Perineum well healed. Vaginal lacerations remain intact. Odor appreciated.   Neurological:      Mental Status: She is alert.   Psychiatric:         Mood and Affect: Mood normal.          Plan    Requested that Dr. Cervantes evaluate laceration for concerns of possible infection. Per Dr. Cervantes laceration is well approximated and healing. Due to improvement in symptoms, will hold off on abx at this time. Patient to complete sitz baths 2-3 x daily to promote healing. Patient to report to office if wound becomes more painful or if she develops a fever.

## 2024-03-01 ENCOUNTER — POSTPARTUM VISIT (OUTPATIENT)
Dept: OBSTETRICS AND GYNECOLOGY | Facility: CLINIC | Age: 34
End: 2024-03-01
Payer: COMMERCIAL

## 2024-03-01 VITALS
WEIGHT: 134.13 LBS | DIASTOLIC BLOOD PRESSURE: 68 MMHG | SYSTOLIC BLOOD PRESSURE: 104 MMHG | BODY MASS INDEX: 23.77 KG/M2 | HEIGHT: 63 IN

## 2024-03-01 DIAGNOSIS — Z30.09 GENERAL COUNSELING AND ADVICE ON CONTRACEPTIVE MANAGEMENT: ICD-10-CM

## 2024-03-01 PROCEDURE — 0503F POSTPARTUM CARE VISIT: CPT

## 2024-03-01 ASSESSMENT — EDINBURGH POSTNATAL DEPRESSION SCALE (EPDS)
I HAVE FELT SAD OR MISERABLE: NO, NOT AT ALL
I HAVE BEEN SO UNHAPPY THAT I HAVE HAD DIFFICULTY SLEEPING: NOT AT ALL
THE THOUGHT OF HARMING MYSELF HAS OCCURRED TO ME: NEVER
I HAVE BLAMED MYSELF UNNECESSARILY WHEN THINGS WENT WRONG: NO, NEVER
THINGS HAVE BEEN GETTING ON TOP OF ME: NO, MOST OF THE TIME I HAVE COPED QUITE WELL
I HAVE BEEN ABLE TO LAUGH AND SEE THE FUNNY SIDE OF THINGS: AS MUCH AS I ALWAYS COULD
I HAVE LOOKED FORWARD WITH ENJOYMENT TO THINGS: AS MUCH AS I EVER DID
I HAVE BEEN ANXIOUS OR WORRIED FOR NO GOOD REASON: HARDLY EVER
I HAVE BEEN SO UNHAPPY THAT I HAVE BEEN CRYING: NO, NEVER
I HAVE FELT SCARED OR PANICKY FOR NO GOOD REASON: NO, NOT AT ALL
TOTAL SCORE: 2

## 2024-03-01 NOTE — PATIENT INSTRUCTIONS
HOW DOES THE PROGESTIN IUD WORK?   The progestin IUD is a T-shaped plastic meena that stays in your uterus. It contains a hormone  (progestin) like the ones your body makes. The hormone blocks sperm from reaching the egg  and stops the release of eggs. If sperm cannot reach an egg, you cannot get pregnant.   No method of birth control is 100% effective. The progestin IUD is over 99% effective.  AFTER THE PROGESTIN IUD IS INSERTED, WHEN CAN I HAVE SEX?   You must wait 24 hours after the IUD is placed before you can use tampons or have sex.  WHEN DOES THE PROGESTIN IUD START WORKING?   The progestin IUD starts to work 7 days after it is inserted. For 7 days after your IUD is  inserted, use condoms or continue your pills/patch/ring as back-up.  HOW LONG DOES THE PROGESTIN IUD LAST?   The progestin IUD works for 3 to 7 years, depending on which IUD you choose.  IS THERE ANYTHING I NEED TO DO AFTER HAVING THE IUD INSERTED?   Some people like to check their IUD’s string after each period. To check, insert a finger into  your vagina and feel for the cervix. (It feels like the tip of your nose.) You should feel the string  near your cervix. Do not pull on the string.  WHAT DO I DO IF AND WHEN I DECIDE TO GET PREGNANT?   When you are ready, your health care provider will remove your IUD. Most people get pregnant  soon after removal.  HOW DOES THE PROGESTIN IUD HELP ME?   You do not need to think about birth control before or during sex.   You do not need refills (as you do for the pill).   You can use the progestin IUD while breastfeeding.   You may have less cramping and bleeding with periods.   The progestin IUD costs less than most types of birth control.  HOW WILL I FEEL HAVING THE PROGESTIN IUD IN ME?  HOW WILL MY BODY CHANGE?   You will not feel the IUD in you.   You may have cramps and spotty periods for the first few months. Ibuprofen can help. You can  take up to 4 pills (800 mg) of Ibuprofen every 8 hours with  food. To prevent cramps, take  Ibuprofen when your period starts and keep taking it every 8 hours for the first 2-3 days of your  period. You can also put a hot water bottle on your belly if you have bad cramps.   You may stop having periods after 1-2 years with the progestin IUD. This is normal.   You may have spotting, bloating, nausea, headaches, or breast tenderness.  DOES THE PROGESTIN IUD HAVE RISKS?   The progestin IUD is very safe. Serious problems are rare. If you have the following symptoms  within the first 3 weeks after getting an IUD, see your health care provider:  - Fever (>101ºF)  - Chills  - Strong or sharp pain in your stomach or belly   If you have the following symptoms at any time while you have an IUD in you, see your health  care provider:  - Feeling pregnant (breast tenderness, nausea, vomiting)  - Positive home pregnancy test  Remember, the  progestin IUD  does not protect  you from Sexually  Transmitted  Infections or HIV.  Always use  condoms to  protect yourself!

## 2024-03-01 NOTE — PROGRESS NOTES
Subjective   33 y.o.  presenting for postpartum follow-up   Delivery Date: 24  Type of Delivery: NSVB  Intrapartum complications: Ghtn, not on meds  Problem List       No episode was linked to this visit.            Pt feeling physically and emotionally well.   Postpartum Depression: Low Risk  (2024)    [place]  Depression Scale     Last EPDS Total Score: 3     Last EPDS Self Harm Result: Never       PP Recovery:   Pain: controlled  Lacerations: vaginal, perineal  Perineal discomfort: pretty good  Lochia: resolved  Feeding Method: She is breast feeding exclusively. no breast or nursing problems  Lactation Consult Needed?: No  Birth Trauma: No  Bonding with Baby: well with baby boy, Francisco J    Sexual Intimacy: No  Contraceptive Method: None  Depression - Denies s/s depression.    Postpartum Depression: Low Risk  (2024)    [place]  Depression Scale     Last EPDS Total Score: 3     Last EPDS Self Harm Result: Never     Emotional Support - SO  Bowel Symptoms - Negative for abdominal discomfort, blood in stool or black stool, and negative change in bowel habits.  Bladder Symptoms - No dysuria, denies hematuria, urinary frequency, urinary urgency or incontinence.   Menses Since Delivery -none    Physical Exam  HENT:      Mouth/Throat:      Mouth: Mucous membranes are moist.   Eyes:      Conjunctiva/sclera: Conjunctivae normal.   Neck:      Thyroid: No thyroid mass, thyromegaly or thyroid tenderness.   Cardiovascular:      Rate and Rhythm: Normal rate and regular rhythm.      Pulses: Normal pulses.   Pulmonary:      Effort: Pulmonary effort is normal.      Breath sounds: Normal breath sounds.   Chest:   Breasts:     Breasts are symmetrical.      Right: Normal.      Left: Normal.   Abdominal:      General: Abdomen is flat.      Palpations: Abdomen is soft.      Hernia: There is no hernia in the left inguinal area or right inguinal area.   Genitourinary:     General: Normal vulva.       Uterus: Normal.       Adnexa: Right adnexa normal and left adnexa normal.      Comments: Lacerations healed  Musculoskeletal:         General: Normal range of motion.      Cervical back: Normal range of motion.   Lymphadenopathy:      Cervical: No cervical adenopathy.      Right cervical: No superficial, deep or posterior cervical adenopathy.     Left cervical: No superficial, deep or posterior cervical adenopathy.      Lower Body: No right inguinal adenopathy. No left inguinal adenopathy.   Skin:     General: Skin is warm.      Capillary Refill: Capillary refill takes less than 2 seconds.   Neurological:      Mental Status: She is alert and oriented to person, place, and time.   Psychiatric:         Mood and Affect: Mood normal.         Behavior: Behavior normal.          Plan      A/P. 33 y.o. now , s/p , normal recovery course  Last pap: 23 NML HPV-  Postpartum contraception discussed - patient elects Mirena IUD. Reviewed efficacy, risks including risk for infection and uterine perforation, benefits, and alternatives with patient. Procedure reviewed in detail. Reviewed with patient that cramping and bleeding are common symptoms for a few days after procedure. Discussed unscheduled bleeding and cramping that can occur for 3-6 months post procedure.  Patient will schedule insertion at her convenience. Patient instructed to premedicate prior to procedure with Tylenol and Motrin.   Returning to exercise and sex discussed. Patient is cleared. Strengthening pelvic floor with Kegels encouraged before high impact exercise to prevent weakening reviewed.   Encouraged patient to continue to monitor for signs or symptoms of  mood and anxiety disorders  Routine follow up with PCP for health maintenance examination encouraged including TSH, cholesterol and vitamin D evaluation.  Encouraged continued breastfeeding. Patient aware resources are available should she need them.   Warning s/s  discussed  All questions answered    F/U for IUD insert, and for routine annual exam

## 2024-03-29 RX ORDER — FERROUS SULFATE 325(65) MG
TABLET ORAL
Qty: 90 TABLET | OUTPATIENT
Start: 2024-03-29